# Patient Record
Sex: FEMALE | Race: WHITE
[De-identification: names, ages, dates, MRNs, and addresses within clinical notes are randomized per-mention and may not be internally consistent; named-entity substitution may affect disease eponyms.]

---

## 2017-04-10 ENCOUNTER — HOSPITAL ENCOUNTER (OUTPATIENT)
Dept: HOSPITAL 79 - KOH-I | Age: 67
End: 2017-04-10
Attending: FAMILY MEDICINE
Payer: COMMERCIAL

## 2017-04-10 DIAGNOSIS — R94.4: Primary | ICD-10-CM

## 2017-04-10 DIAGNOSIS — K76.0: ICD-10-CM

## 2017-04-17 ENCOUNTER — HOSPITAL ENCOUNTER (EMERGENCY)
Dept: HOSPITAL 79 - ER1 | Age: 67
Discharge: LEFT BEFORE DISCHARGE | End: 2017-04-17
Payer: COMMERCIAL

## 2017-04-17 DIAGNOSIS — Z53.21: Primary | ICD-10-CM

## 2017-06-08 ENCOUNTER — TELEPHONE (OUTPATIENT)
Dept: ONCOLOGY | Facility: CLINIC | Age: 67
End: 2017-06-08

## 2017-06-08 NOTE — TELEPHONE ENCOUNTER
----- Message from Xuan Jacob sent at 6/8/2017  1:48 PM EDT -----  Regarding: [facility]-LAB ORDER  Contact: [phone]  Patient wants to get some labs drawn she didn't come to December appointment she is tired more than usual? Please call

## 2017-06-08 NOTE — TELEPHONE ENCOUNTER
Spoke with Bailey. She was last seen in September 2016. She had an appt in December 2016 and she cancelled it. Told her that she needs to see Dr. Correia and get scheduled for lab. Told her that she could also see her PCP if she cannot get an appt soon. Transferred to [facility] for appt.

## 2017-06-20 ENCOUNTER — OFFICE VISIT (OUTPATIENT)
Dept: ONCOLOGY | Facility: CLINIC | Age: 67
End: 2017-06-20

## 2017-06-20 ENCOUNTER — APPOINTMENT (OUTPATIENT)
Dept: LAB | Facility: HOSPITAL | Age: 67
End: 2017-06-20

## 2017-06-20 VITALS
HEART RATE: 84 BPM | TEMPERATURE: 97.6 F | DIASTOLIC BLOOD PRESSURE: 85 MMHG | BODY MASS INDEX: 35.33 KG/M2 | SYSTOLIC BLOOD PRESSURE: 200 MMHG | HEIGHT: 62 IN | WEIGHT: 192 LBS | RESPIRATION RATE: 19 BRPM

## 2017-06-20 DIAGNOSIS — D50.8 IRON DEFICIENCY ANEMIA SECONDARY TO INADEQUATE DIETARY IRON INTAKE: Primary | ICD-10-CM

## 2017-06-20 LAB
ALBUMIN SERPL-MCNC: 4.2 G/DL (ref 3.5–5)
ALBUMIN/GLOB SERPL: 1.4 G/DL (ref 1–2)
ALP SERPL-CCNC: 90 U/L (ref 38–126)
ALT SERPL W P-5'-P-CCNC: 63 U/L (ref 13–69)
ANION GAP SERPL CALCULATED.3IONS-SCNC: 17.9 MMOL/L
ANISOCYTOSIS BLD QL: NORMAL
AST SERPL-CCNC: 66 U/L (ref 15–46)
BASOPHILS # BLD AUTO: 0.05 10*3/MM3 (ref 0–0.2)
BASOPHILS NFR BLD AUTO: 0.9 % (ref 0–2.5)
BILIRUB SERPL-MCNC: 0.5 MG/DL (ref 0.2–1.3)
BUN BLD-MCNC: 10 MG/DL (ref 7–20)
BUN/CREAT SERPL: 12.5 (ref 7.1–23.5)
CALCIUM SPEC-SCNC: 9.8 MG/DL (ref 8.4–10.2)
CHLORIDE SERPL-SCNC: 102 MMOL/L (ref 98–107)
CO2 SERPL-SCNC: 25 MMOL/L (ref 26–30)
CREAT BLD-MCNC: 0.8 MG/DL (ref 0.6–1.3)
DEPRECATED RDW RBC AUTO: 44.4 FL (ref 37–54)
ELLIPTOCYTES BLD QL SMEAR: NORMAL
EOSINOPHIL # BLD AUTO: 0.33 10*3/MM3 (ref 0–0.7)
EOSINOPHIL NFR BLD AUTO: 5.9 % (ref 0–7)
ERYTHROCYTE [DISTWIDTH] IN BLOOD BY AUTOMATED COUNT: 17.3 % (ref 11.5–14.5)
FERRITIN SERPL-MCNC: 7.19 NG/ML (ref 11.1–264)
GFR SERPL CREATININE-BSD FRML MDRD: 72 ML/MIN/1.73
GLOBULIN UR ELPH-MCNC: 2.9 GM/DL
GLUCOSE BLD-MCNC: 119 MG/DL (ref 74–98)
HCT VFR BLD AUTO: 28.8 % (ref 37–47)
HGB BLD-MCNC: 8.3 G/DL (ref 12–16)
HYPOCHROMIA BLD QL: NORMAL
IMM GRANULOCYTES # BLD: 0.08 10*3/MM3 (ref 0–0.06)
IMM GRANULOCYTES NFR BLD: 1.4 % (ref 0–0.6)
IRON 24H UR-MRATE: <10 MCG/DL (ref 37–181)
IRON SATN MFR SERPL: ABNORMAL % (ref 11–46)
LYMPHOCYTES # BLD AUTO: 1.6 10*3/MM3 (ref 0.6–3.4)
LYMPHOCYTES NFR BLD AUTO: 28.5 % (ref 10–50)
MCH RBC QN AUTO: 20.5 PG (ref 27–31)
MCHC RBC AUTO-ENTMCNC: 28.8 G/DL (ref 30–37)
MCV RBC AUTO: 71.1 FL (ref 81–99)
MICROCYTES BLD QL: NORMAL
MONOCYTES # BLD AUTO: 0.47 10*3/MM3 (ref 0–0.9)
MONOCYTES NFR BLD AUTO: 8.4 % (ref 0–12)
NEUTROPHILS # BLD AUTO: 3.09 10*3/MM3 (ref 2–6.9)
NEUTROPHILS NFR BLD AUTO: 54.9 % (ref 37–80)
NRBC BLD MANUAL-RTO: 0 /100 WBC (ref 0–0)
PLATELET # BLD AUTO: 253 10*3/MM3 (ref 130–400)
PMV BLD AUTO: 9.9 FL (ref 6–12)
POIKILOCYTOSIS BLD QL SMEAR: NORMAL
POLYCHROMASIA BLD QL SMEAR: NORMAL
POTASSIUM BLD-SCNC: 3.9 MMOL/L (ref 3.5–5.1)
PROT SERPL-MCNC: 7.1 G/DL (ref 6.3–8.2)
RBC # BLD AUTO: 4.05 10*6/MM3 (ref 4.2–5.4)
SMALL PLATELETS BLD QL SMEAR: ADEQUATE
SODIUM BLD-SCNC: 141 MMOL/L (ref 137–145)
TIBC SERPL-MCNC: 442 MCG/DL (ref 261–497)
VIT B12 BLD-MCNC: 386 PG/ML (ref 239–931)
WBC MORPH BLD: NORMAL
WBC NRBC COR # BLD: 5.62 10*3/MM3 (ref 4.8–10.8)

## 2017-06-20 PROCEDURE — 83550 IRON BINDING TEST: CPT | Performed by: NURSE PRACTITIONER

## 2017-06-20 PROCEDURE — 85025 COMPLETE CBC W/AUTO DIFF WBC: CPT | Performed by: NURSE PRACTITIONER

## 2017-06-20 PROCEDURE — 85007 BL SMEAR W/DIFF WBC COUNT: CPT | Performed by: NURSE PRACTITIONER

## 2017-06-20 PROCEDURE — 99213 OFFICE O/P EST LOW 20 MIN: CPT | Performed by: NURSE PRACTITIONER

## 2017-06-20 PROCEDURE — 36415 COLL VENOUS BLD VENIPUNCTURE: CPT | Performed by: NURSE PRACTITIONER

## 2017-06-20 PROCEDURE — 82607 VITAMIN B-12: CPT | Performed by: NURSE PRACTITIONER

## 2017-06-20 PROCEDURE — 83540 ASSAY OF IRON: CPT | Performed by: NURSE PRACTITIONER

## 2017-06-20 PROCEDURE — 80053 COMPREHEN METABOLIC PANEL: CPT | Performed by: NURSE PRACTITIONER

## 2017-06-20 PROCEDURE — 82728 ASSAY OF FERRITIN: CPT | Performed by: NURSE PRACTITIONER

## 2017-06-20 RX ORDER — SODIUM CHLORIDE 9 MG/ML
250 INJECTION, SOLUTION INTRAVENOUS ONCE
Status: CANCELLED | OUTPATIENT
Start: 2017-07-05

## 2017-06-20 RX ORDER — SODIUM CHLORIDE 9 MG/ML
250 INJECTION, SOLUTION INTRAVENOUS ONCE
Status: CANCELLED | OUTPATIENT
Start: 2017-06-26

## 2017-06-20 NOTE — PROGRESS NOTES
PROBLEM LIST:  1. Recurring iron deficiency anemia with intolerance of oral iron.   2. History of breast cancer.   3. History of melanoma.   4. Coronary artery disease with prior stents.   5. History of pulmonary embolus.   6. Hypertension.       CHIEF COMPLAINT: Follow-up about iron deficiency anemia.     Subjective     HISTORY OF PRESENT ILLNESS:   Ms. Howe is here for follow-up evaluation for recurring iron deficiency anemia. She does have an intolerance to oral iron. She is experiencing symptoms that she thinks is related to iron deficiency. She has noticed increased weakness and fatigue along with occasional dizziness and dyspnea with exertion.  She denies any chest pain, palpitations, nose or gum bleeding. No melena, hematochezia, or hematuria.      Past Medical History, Past Surgical History, Social History, Family History have been reviewed and are without significant changes except as mentioned.    Review of Systems   A comprehensive 14 point review of systems was performed and was negative except as mentioned.    Medications:  The current medication list was reviewed in the EMR    ALLERGIES:  No Known Allergies    Objective      BP (!) 200/85  Pulse 84  Temp 97.6 °F (36.4 °C) (Temporal Artery )   Resp 19  Wt 192 lb (87.1 kg)  BMI 35.12 kg/m2         General: well appearing, in no acute distress   HEENT: sclera anicteric, oropharynx clear  Lymphatics: no cervical, supraclavicular, or axillary adenopathy  Cardiovascular: regular rate and rhythm, no murmurs  Lungs: clear to auscultation bilaterally  Abdomen: soft, nontender, nondistended.  No palpable masses or organomegaly  Extremeties: no lower extremity edema, cords or calf tenderness  Skin: no rashes, lesions, bruising, or petechiae    RECENT LABS:  Hematology WBC   Date Value Ref Range Status   09/20/2016 7.18 3.50 - 10.80 10*3/mm3 Final     Hemoglobin   Date Value Ref Range Status   09/20/2016 14.1 11.5 - 15.5 g/dL Final     Hematocrit   Date  Value Ref Range Status   09/20/2016 43.4 34.5 - 44.0 % Final     MCV   Date Value Ref Range Status   09/20/2016 93.5 80.0 - 99.0 fL Final     RDW   Date Value Ref Range Status   09/20/2016 14.2 11.3 - 14.5 % Final     MPV   Date Value Ref Range Status   09/20/2016 10.4 6.0 - 12.0 fL Final     Platelets   Date Value Ref Range Status   09/20/2016 285 150 - 450 10*3/mm3 Final     Immature Grans %   Date Value Ref Range Status   09/20/2016 0.1 0.0 - 0.6 % Final     Neutrophils, Absolute   Date Value Ref Range Status   09/20/2016 3.79 1.50 - 8.30 10*3/mm3 Final     Lymphocytes, Absolute   Date Value Ref Range Status   09/20/2016 2.14 0.60 - 4.80 10*3/mm3 Final     Monocytes, Absolute   Date Value Ref Range Status   09/20/2016 0.83 0.00 - 1.00 10*3/mm3 Final     Eosinophils, Absolute   Date Value Ref Range Status   09/20/2016 0.35 (H) 0.10 - 0.30 10*3/mm3 Final     Basophils, Absolute   Date Value Ref Range Status   09/20/2016 0.06 0.00 - 0.20 10*3/mm3 Final     Immature Grans, Absolute   Date Value Ref Range Status   09/20/2016 0.01 0.00 - 0.03 10*3/mm3 Final       Glucose   Date Value Ref Range Status   08/11/2014 128 (H) 70 - 100 mg/dL Final     Sodium   Date Value Ref Range Status   08/11/2014 138 135 - 153 mmol/L Final     Potassium   Date Value Ref Range Status   08/11/2014 4.1 3.5 - 5.3 mmol/L Final     CO2   Date Value Ref Range Status   08/11/2014 26.7 24.3 - 31.9 mmol/L Final     Chloride   Date Value Ref Range Status   08/11/2014 105 99 - 112 mmol/L Final     Anion Gap   Date Value Ref Range Status   08/11/2014 6.3 3.6 - 11.2 Final     Creatinine   Date Value Ref Range Status   08/11/2014 0.80 0.43 - 1.29 mg/dL Final     BUN   Date Value Ref Range Status   08/11/2014 15 7 - 21 mg/dL Final     BUN/Creatinine Ratio   Date Value Ref Range Status   08/11/2014 18.8  Final     Calcium   Date Value Ref Range Status   08/11/2014 9.1 7.7 - 10.0 mg/dL Final     Alkaline Phosphatase   Date Value Ref Range Status    08/11/2014 70 35 - 104 U/L Final     Total Protein   Date Value Ref Range Status   08/11/2014 6.6 6.0 - 8.0 g/dL Final     ALT (SGPT)   Date Value Ref Range Status   08/11/2014 30 10 - 36 U/L Final     AST (SGOT)   Date Value Ref Range Status   08/11/2014 32 (H) 10 - 30 U/L Final     Total Bilirubin   Date Value Ref Range Status   08/11/2014 0.4 0.2 - 1.8 mg/dL Final     Albumin   Date Value Ref Range Status   08/11/2014 4.2 3.4 - 4.8 g/dL Final     A/G Ratio   Date Value Ref Range Status   08/11/2014 1.8 1.5 - 2.5 Final       No results found for: LDH, URICACID       Assessment/Plan   IMPRESSION:   1. Recurring iron deficiency anemia. She last required IV Feraheme on 04/21/2016. She is currently experiencing symptoms that are most likely related to iron deficiency. We will check her labs today. If her iron is low we will set her up for Feraheme since she is unable to tolerate po iron.       PLAN:   1. We will obtain a CBC, Vitamin B 12, CMP,  ferritin and iron panel today.   2. We will see her back in 2 months for follow-up evaluation. She has been instructed to contact us in the interim if any new symptoms arise.                 Vera Hubbard, [facility] Hematology and Oncology    6/20/2017          CC:

## 2017-06-26 ENCOUNTER — HOSPITAL ENCOUNTER (OUTPATIENT)
Dept: INFUSION THERAPY | Facility: HOSPITAL | Age: 67
Setting detail: INFUSION SERIES
Discharge: HOME | End: 2017-06-26

## 2017-06-26 VITALS
HEIGHT: 62 IN | HEART RATE: 86 BPM | SYSTOLIC BLOOD PRESSURE: 156 MMHG | OXYGEN SATURATION: 99 % | RESPIRATION RATE: 18 BRPM | BODY MASS INDEX: 34.96 KG/M2 | TEMPERATURE: 97.8 F | WEIGHT: 190 LBS | DIASTOLIC BLOOD PRESSURE: 99 MMHG

## 2017-06-26 DIAGNOSIS — D50.8 IRON DEFICIENCY ANEMIA SECONDARY TO INADEQUATE DIETARY IRON INTAKE: ICD-10-CM

## 2017-06-26 PROCEDURE — 96374 THER/PROPH/DIAG INJ IV PUSH: CPT

## 2017-06-26 PROCEDURE — 25010000002 FERUMOXYTOL 510 MG/17ML SOLUTION 510 MG VIAL: Performed by: NURSE PRACTITIONER

## 2017-06-26 RX ORDER — SODIUM CHLORIDE 9 MG/ML
250 INJECTION, SOLUTION INTRAVENOUS ONCE
Status: COMPLETED | OUTPATIENT
Start: 2017-06-26 | End: 2017-06-26

## 2017-06-26 RX ADMIN — SODIUM CHLORIDE 250 ML: 9 INJECTION, SOLUTION INTRAVENOUS at 12:24

## 2017-06-26 RX ADMIN — FERUMOXYTOL 510 MG: 510 INJECTION INTRAVENOUS at 12:25

## 2017-06-26 NOTE — PATIENT INSTRUCTIONS
Post IV Iron Infusion    Notify your doctor/practitioner or come to the emergency room if the following occurs:    1.  Rash  2.  Nausea and vomiting  3.  Diarrhea   4.  Blood in your urine  5.  Rapid heartbeat   6.  Chest pain  7.  Breathing difficulty  8.  Chest pain / tightness  9.  Any other unusual symptoms  10.  Decrease of blood pressure (if Ferumoxytol was given)

## 2017-06-27 ENCOUNTER — APPOINTMENT (OUTPATIENT)
Dept: INFUSION THERAPY | Facility: HOSPITAL | Age: 67
End: 2017-06-27

## 2017-07-05 ENCOUNTER — HOSPITAL ENCOUNTER (OUTPATIENT)
Dept: INFUSION THERAPY | Facility: HOSPITAL | Age: 67
Setting detail: INFUSION SERIES
Discharge: HOME | End: 2017-07-05

## 2017-07-05 VITALS
DIASTOLIC BLOOD PRESSURE: 99 MMHG | TEMPERATURE: 97.5 F | RESPIRATION RATE: 18 BRPM | SYSTOLIC BLOOD PRESSURE: 159 MMHG | HEART RATE: 71 BPM | OXYGEN SATURATION: 97 %

## 2017-07-05 DIAGNOSIS — D50.8 IRON DEFICIENCY ANEMIA SECONDARY TO INADEQUATE DIETARY IRON INTAKE: ICD-10-CM

## 2017-07-05 PROCEDURE — 96374 THER/PROPH/DIAG INJ IV PUSH: CPT

## 2017-07-05 PROCEDURE — 25010000002 FERUMOXYTOL 510 MG/17ML SOLUTION 510 MG VIAL: Performed by: NURSE PRACTITIONER

## 2017-07-05 RX ORDER — SODIUM CHLORIDE 9 MG/ML
250 INJECTION, SOLUTION INTRAVENOUS ONCE
Status: COMPLETED | OUTPATIENT
Start: 2017-07-05 | End: 2017-07-05

## 2017-07-05 RX ADMIN — SODIUM CHLORIDE 250 ML: 9 INJECTION, SOLUTION INTRAVENOUS at 15:06

## 2017-07-05 RX ADMIN — FERUMOXYTOL 510 MG: 510 INJECTION INTRAVENOUS at 15:07

## 2017-08-29 ENCOUNTER — APPOINTMENT (OUTPATIENT)
Dept: LAB | Facility: HOSPITAL | Age: 67
End: 2017-08-29

## 2017-08-29 ENCOUNTER — OFFICE VISIT (OUTPATIENT)
Dept: ONCOLOGY | Facility: CLINIC | Age: 67
End: 2017-08-29

## 2017-08-29 VITALS
HEART RATE: 90 BPM | DIASTOLIC BLOOD PRESSURE: 95 MMHG | SYSTOLIC BLOOD PRESSURE: 171 MMHG | WEIGHT: 188 LBS | RESPIRATION RATE: 20 BRPM | TEMPERATURE: 97.6 F | BODY MASS INDEX: 34.39 KG/M2

## 2017-08-29 DIAGNOSIS — D50.8 IRON DEFICIENCY ANEMIA SECONDARY TO INADEQUATE DIETARY IRON INTAKE: Primary | ICD-10-CM

## 2017-08-29 LAB
ALBUMIN SERPL-MCNC: 4.4 G/DL (ref 3.5–5)
ALBUMIN/GLOB SERPL: 1.5 G/DL (ref 1–2)
ALP SERPL-CCNC: 97 U/L (ref 38–126)
ALT SERPL W P-5'-P-CCNC: 73 U/L (ref 13–69)
ANION GAP SERPL CALCULATED.3IONS-SCNC: 14.4 MMOL/L
ANISOCYTOSIS BLD QL: NORMAL
AST SERPL-CCNC: 45 U/L (ref 15–46)
BASOPHILS # BLD AUTO: 0.07 10*3/MM3 (ref 0–0.2)
BASOPHILS NFR BLD AUTO: 1.1 % (ref 0–2.5)
BILIRUB SERPL-MCNC: 0.4 MG/DL (ref 0.2–1.3)
BUN BLD-MCNC: 11 MG/DL (ref 7–20)
BUN/CREAT SERPL: 15.7 (ref 7.1–23.5)
CALCIUM SPEC-SCNC: 9.9 MG/DL (ref 8.4–10.2)
CHLORIDE SERPL-SCNC: 103 MMOL/L (ref 98–107)
CO2 SERPL-SCNC: 29 MMOL/L (ref 26–30)
CREAT BLD-MCNC: 0.7 MG/DL (ref 0.6–1.3)
DEPRECATED RDW RBC AUTO: 72.2 FL (ref 37–54)
ELLIPTOCYTES BLD QL SMEAR: NORMAL
EOSINOPHIL # BLD AUTO: 0.62 10*3/MM3 (ref 0–0.7)
EOSINOPHIL NFR BLD AUTO: 9.9 % (ref 0–7)
ERYTHROCYTE [DISTWIDTH] IN BLOOD BY AUTOMATED COUNT: 23.4 % (ref 11.5–14.5)
FERRITIN SERPL-MCNC: 21.8 NG/ML (ref 11.1–264)
GFR SERPL CREATININE-BSD FRML MDRD: 83 ML/MIN/1.73
GLOBULIN UR ELPH-MCNC: 3 GM/DL
GLUCOSE BLD-MCNC: 124 MG/DL (ref 74–98)
HCT VFR BLD AUTO: 45.5 % (ref 37–47)
HGB BLD-MCNC: 14.8 G/DL (ref 12–16)
IMM GRANULOCYTES # BLD: 0.02 10*3/MM3 (ref 0–0.06)
IMM GRANULOCYTES NFR BLD: 0.3 % (ref 0–0.6)
IRON 24H UR-MRATE: 53 MCG/DL (ref 37–181)
IRON SATN MFR SERPL: 16 % (ref 11–46)
LYMPHOCYTES # BLD AUTO: 1.93 10*3/MM3 (ref 0.6–3.4)
LYMPHOCYTES NFR BLD AUTO: 30.8 % (ref 10–50)
MCH RBC QN AUTO: 28.1 PG (ref 27–31)
MCHC RBC AUTO-ENTMCNC: 32.5 G/DL (ref 30–37)
MCV RBC AUTO: 86.3 FL (ref 81–99)
MONOCYTES # BLD AUTO: 0.58 10*3/MM3 (ref 0–0.9)
MONOCYTES NFR BLD AUTO: 9.3 % (ref 0–12)
NEUTROPHILS # BLD AUTO: 3.05 10*3/MM3 (ref 2–6.9)
NEUTROPHILS NFR BLD AUTO: 48.6 % (ref 37–80)
NRBC BLD MANUAL-RTO: 0 /100 WBC (ref 0–0)
PLATELET # BLD AUTO: 216 10*3/MM3 (ref 130–400)
PMV BLD AUTO: 10.2 FL (ref 6–12)
POIKILOCYTOSIS BLD QL SMEAR: NORMAL
POTASSIUM BLD-SCNC: 4.4 MMOL/L (ref 3.5–5.1)
PROT SERPL-MCNC: 7.4 G/DL (ref 6.3–8.2)
RBC # BLD AUTO: 5.27 10*6/MM3 (ref 4.2–5.4)
SMALL PLATELETS BLD QL SMEAR: ADEQUATE
SODIUM BLD-SCNC: 142 MMOL/L (ref 137–145)
TIBC SERPL-MCNC: 336 MCG/DL (ref 261–497)
WBC MORPH BLD: NORMAL
WBC NRBC COR # BLD: 6.27 10*3/MM3 (ref 4.8–10.8)

## 2017-08-29 PROCEDURE — 83540 ASSAY OF IRON: CPT | Performed by: NURSE PRACTITIONER

## 2017-08-29 PROCEDURE — 85007 BL SMEAR W/DIFF WBC COUNT: CPT | Performed by: NURSE PRACTITIONER

## 2017-08-29 PROCEDURE — 80053 COMPREHEN METABOLIC PANEL: CPT | Performed by: NURSE PRACTITIONER

## 2017-08-29 PROCEDURE — 83550 IRON BINDING TEST: CPT | Performed by: NURSE PRACTITIONER

## 2017-08-29 PROCEDURE — 99213 OFFICE O/P EST LOW 20 MIN: CPT | Performed by: NURSE PRACTITIONER

## 2017-08-29 PROCEDURE — 36415 COLL VENOUS BLD VENIPUNCTURE: CPT | Performed by: NURSE PRACTITIONER

## 2017-08-29 PROCEDURE — 82728 ASSAY OF FERRITIN: CPT | Performed by: NURSE PRACTITIONER

## 2017-08-29 PROCEDURE — 85025 COMPLETE CBC W/AUTO DIFF WBC: CPT | Performed by: NURSE PRACTITIONER

## 2017-08-29 RX ORDER — AMLODIPINE BESYLATE 10 MG/1
TABLET ORAL
Refills: 2 | COMMUNITY
Start: 2017-08-05 | End: 2019-03-19 | Stop reason: HOSPADM

## 2017-08-29 NOTE — PROGRESS NOTES
PROBLEM LIST:  1.  Recurring iron deficiency anemia with intolerance of oral iron.   2.  History of breast cancer.   3.  History of melanoma.   4.  Coronary artery disease with prior stents.   5.  History of pulmonary embolus.   6.  Hypertension.      CHIEF COMPLAINT: Follow-up about iron deficiency anemia.    Subjective     HISTORY OF PRESENT ILLNESS:   Mrs. Howe is here for follow up evaluation of anemia. She required IV Feraheme on 6/26/2017 and 07/05/2017 for iron deficiency anemia. She tolerated her infusions well. Her fatigue has resolved. She denies any chest pain, palpitations, or shortness of air.     Past Medical History, Past Surgical History, Social History, Family History have been reviewed and are without significant changes except as mentioned.    Review of Systems   A comprehensive 14 point review of systems was performed and was negative except as mentioned.    Medications:  The current medication list was reviewed in the EMR    ALLERGIES:  No Known Allergies    Objective      /95  Pulse 90  Temp 97.6 °F (36.4 °C) (Temporal Artery )   Resp 20  Wt 188 lb (85.3 kg)  BMI 34.39 kg/m2         General: well appearing, in no acute distress   HEENT: sclera anicteric, oropharynx clear  Lymphatics: no cervical, supraclavicular, or axillary adenopathy  Cardiovascular: regular rate and rhythm, no murmurs  Lungs: clear to auscultation bilaterally  Abdomen: soft, nontender, nondistended.  No palpable masses or organomegaly  Extremeties: no lower extremity edema, cords or calf tenderness  Skin: no rashes, lesions, bruising, or petechiae    RECENT LABS:  Hematology WBC   Date Value Ref Range Status   06/20/2017 5.62 4.80 - 10.80 10*3/mm3 Final     Hemoglobin   Date Value Ref Range Status   06/20/2017 8.3 (L) 12.0 - 16.0 g/dL Final     Hematocrit   Date Value Ref Range Status   06/20/2017 28.8 (L) 37.0 - 47.0 % Final     MCV   Date Value Ref Range Status   06/20/2017 71.1 (L) 81.0 - 99.0 fL Final      RDW   Date Value Ref Range Status   06/20/2017 17.3 (H) 11.5 - 14.5 % Final     MPV   Date Value Ref Range Status   06/20/2017 9.9 6.0 - 12.0 fL Final     Platelets   Date Value Ref Range Status   06/20/2017 253 130 - 400 10*3/mm3 Final     Immature Grans %   Date Value Ref Range Status   06/20/2017 1.4 (H) 0.0 - 0.6 % Final     Neutrophils, Absolute   Date Value Ref Range Status   06/20/2017 3.09 2.00 - 6.90 10*3/mm3 Final     Lymphocytes, Absolute   Date Value Ref Range Status   06/20/2017 1.60 0.60 - 3.40 10*3/mm3 Final     Monocytes, Absolute   Date Value Ref Range Status   06/20/2017 0.47 0.00 - 0.90 10*3/mm3 Final     Eosinophils, Absolute   Date Value Ref Range Status   06/20/2017 0.33 0.00 - 0.70 10*3/mm3 Final     Basophils, Absolute   Date Value Ref Range Status   06/20/2017 0.05 0.00 - 0.20 10*3/mm3 Final     Immature Grans, Absolute   Date Value Ref Range Status   06/20/2017 0.08 (H) 0.00 - 0.06 10*3/mm3 Final     nRBC   Date Value Ref Range Status   06/20/2017 0.0 0.0 - 0.0 /100 WBC Final       Glucose   Date Value Ref Range Status   06/20/2017 119 (H) 74 - 98 mg/dL Final     Sodium   Date Value Ref Range Status   06/20/2017 141 137 - 145 mmol/L Final     Potassium   Date Value Ref Range Status   06/20/2017 3.9 3.5 - 5.1 mmol/L Final     CO2   Date Value Ref Range Status   06/20/2017 25.0 (L) 26.0 - 30.0 mmol/L Final     Chloride   Date Value Ref Range Status   06/20/2017 102 98 - 107 mmol/L Final     Anion Gap   Date Value Ref Range Status   06/20/2017 17.9 mmol/L Final     Creatinine   Date Value Ref Range Status   06/20/2017 0.80 0.60 - 1.30 mg/dL Final     BUN   Date Value Ref Range Status   06/20/2017 10 7 - 20 mg/dL Final     BUN/Creatinine Ratio   Date Value Ref Range Status   06/20/2017 12.5 7.1 - 23.5 Final     Calcium   Date Value Ref Range Status   06/20/2017 9.8 8.4 - 10.2 mg/dL Final     eGFR Non  Amer   Date Value Ref Range Status   06/20/2017 72 >60 mL/min/1.73 Final      Alkaline Phosphatase   Date Value Ref Range Status   06/20/2017 90 38 - 126 U/L Final     Total Protein   Date Value Ref Range Status   06/20/2017 7.1 6.3 - 8.2 g/dL Final     ALT (SGPT)   Date Value Ref Range Status   06/20/2017 63 13 - 69 U/L Final     AST (SGOT)   Date Value Ref Range Status   06/20/2017 66 (H) 15 - 46 U/L Final     Total Bilirubin   Date Value Ref Range Status   06/20/2017 0.5 0.2 - 1.3 mg/dL Final     Albumin   Date Value Ref Range Status   06/20/2017 4.20 3.50 - 5.00 g/dL Final     Globulin   Date Value Ref Range Status   06/20/2017 2.9 gm/dL Final     A/G Ratio   Date Value Ref Range Status   06/20/2017 1.4 1.0 - 2.0 g/dL Final       No results found for: LDH, URICACID       Assessment/Plan   IMPRESSION:   1. Recurring iron deficiency anemia. She had symptomatic anemia and required an IV Feraheme on 06/26 and 07/5/2017.  She is feeling better at this time.      PLAN:   1.  We will obtain a CBC, CMP, ferritin and iron panel today  2.  If these are improving, we will obtain a CBC in 2 months   3. We will see her back in 4 months for follow-up evaluation. She has been instructed to contact us in the interim if any new symptoms arise.               Vera Hubbard APRMATT  [facility] Hematology and Oncology    8/29/2017          CC:

## 2018-07-03 VITALS — HEIGHT: 66 IN | BODY MASS INDEX: 30.22 KG/M2 | WEIGHT: 188 LBS

## 2018-07-03 DIAGNOSIS — IMO0001 IRON AND ITS COMPOUNDS CAUSING ADVERSE EFFECT IN THERAPEUTIC USE, SUBSEQUENT ENCOUNTER: ICD-10-CM

## 2018-07-03 DIAGNOSIS — D50.8 IRON DEFICIENCY ANEMIA SECONDARY TO INADEQUATE DIETARY IRON INTAKE: ICD-10-CM

## 2018-07-03 PROBLEM — T45.4X5A ADVERSE EFFECT OF IRON OR ITS COMPOUND: Status: ACTIVE | Noted: 2018-07-03

## 2018-07-03 RX ORDER — SODIUM CHLORIDE 9 MG/ML
250 INJECTION, SOLUTION INTRAVENOUS ONCE
Status: CANCELLED | OUTPATIENT
Start: 2018-07-24

## 2018-07-03 RX ORDER — SODIUM CHLORIDE 9 MG/ML
250 INJECTION, SOLUTION INTRAVENOUS ONCE
Status: CANCELLED | OUTPATIENT
Start: 2018-07-17

## 2018-07-17 ENCOUNTER — OFFICE VISIT (OUTPATIENT)
Dept: ONCOLOGY | Facility: CLINIC | Age: 68
End: 2018-07-17

## 2018-07-17 ENCOUNTER — INFUSION (OUTPATIENT)
Dept: ONCOLOGY | Facility: HOSPITAL | Age: 68
End: 2018-07-17

## 2018-07-17 VITALS
SYSTOLIC BLOOD PRESSURE: 135 MMHG | OXYGEN SATURATION: 100 % | DIASTOLIC BLOOD PRESSURE: 83 MMHG | BODY MASS INDEX: 35.19 KG/M2 | WEIGHT: 191.2 LBS | TEMPERATURE: 97.3 F | HEART RATE: 93 BPM | RESPIRATION RATE: 20 BRPM | HEIGHT: 62 IN

## 2018-07-17 VITALS — DIASTOLIC BLOOD PRESSURE: 77 MMHG | SYSTOLIC BLOOD PRESSURE: 153 MMHG | HEART RATE: 72 BPM

## 2018-07-17 DIAGNOSIS — IMO0001 IRON AND ITS COMPOUNDS CAUSING ADVERSE EFFECT IN THERAPEUTIC USE, SUBSEQUENT ENCOUNTER: Primary | ICD-10-CM

## 2018-07-17 DIAGNOSIS — D50.8 IRON DEFICIENCY ANEMIA SECONDARY TO INADEQUATE DIETARY IRON INTAKE: Primary | ICD-10-CM

## 2018-07-17 DIAGNOSIS — D50.8 IRON DEFICIENCY ANEMIA SECONDARY TO INADEQUATE DIETARY IRON INTAKE: ICD-10-CM

## 2018-07-17 PROCEDURE — 96374 THER/PROPH/DIAG INJ IV PUSH: CPT

## 2018-07-17 PROCEDURE — 99213 OFFICE O/P EST LOW 20 MIN: CPT | Performed by: NURSE PRACTITIONER

## 2018-07-17 PROCEDURE — 25010000002 FERUMOXYTOL 510 MG/17ML SOLUTION 510 MG VIAL: Performed by: NURSE PRACTITIONER

## 2018-07-17 RX ORDER — SODIUM CHLORIDE 9 MG/ML
250 INJECTION, SOLUTION INTRAVENOUS ONCE
Status: DISCONTINUED | OUTPATIENT
Start: 2018-07-17 | End: 2018-07-17 | Stop reason: HOSPADM

## 2018-07-17 RX ADMIN — FERUMOXYTOL 510 MG: 510 INJECTION INTRAVENOUS at 14:07

## 2018-07-17 NOTE — PROGRESS NOTES
"      PROBLEM LIST:  1.  Recurring iron deficiency anemia with intolerance of oral iron.   2.  History of breast cancer.   3.  History of melanoma.   4.  Coronary artery disease with prior stents.   5.  History of pulmonary embolus.   6.  Hypertension.      CHIEF COMPLAINT: Follow-up about iron deficiency anemia.    Subjective     HISTORY OF PRESENT ILLNESS:   Mrs. Howe is here for follow up evaluation of anemia.  She reports approximately 1 month of fatigue and dyspnea.  She is craving ice and cheese.  She denies any chest pain or palpitations.  No nose or gum bleeding.  No melena hematochezia or hematuria.  Her last IV Feraheme was one year ago.    Past Medical History, Past Surgical History, Social History, Family History have been reviewed and are without significant changes except as mentioned.    Review of Systems   A comprehensive 14 point review of systems was performed and was negative except as mentioned.    Medications:  The current medication list was reviewed in the EMR    ALLERGIES:  No Known Allergies    Objective      /83   Pulse 93   Temp 97.3 °F (36.3 °C) (Temporal Artery )   Resp 20   Ht 157.5 cm (62.01\")   Wt 86.7 kg (191 lb 3.2 oz)   SpO2 100%   BMI 34.96 kg/m²          General: well appearing, in no acute distress   HEENT: sclera anicteric, oropharynx clear  Lymphatics: no cervical, supraclavicular, or axillary adenopathy  Cardiovascular: regular rate and rhythm, no murmurs  Lungs: clear to auscultation bilaterally  Abdomen: soft, nontender, nondistended.  No palpable masses or organomegaly  Extremeties: no lower extremity edema, cords or calf tenderness  Skin: no rashes, lesions, bruising, or petechiae    RECENT LABS:  Hematology     7/2/2018:              Iron 16   Iron saturation 4   Ferritin 10   WBCs 6.0  RBC 4.20  Hemoglobin 8.9  Hematocrit 30.8  MCV 73  MCH 21.2  RDW 16.5  Platelets 373                Assessment/Plan   IMPRESSION:   1. Recurring iron deficiency anemia. She " had symptomatic anemia and required IV Feraheme last 07/5/2017.  She has recurring symptomatic iron deficiency again at this time and we have her set up for IV Feraheme today and next week.     PLAN:   1. IV Feraheme is set up for today and next week.  2. CBC, ferritin, and iron profile in 6 weeks.  3. We will see her back in 4 months for follow-up evaluation. She has been instructed to contact us in the interim if any new symptoms arise.               SUZETTE Medrano  [facility] Hematology and Oncology    7/17/2018          CC:

## 2018-07-24 ENCOUNTER — INFUSION (OUTPATIENT)
Dept: ONCOLOGY | Facility: HOSPITAL | Age: 68
End: 2018-07-24

## 2018-07-24 DIAGNOSIS — D50.8 IRON DEFICIENCY ANEMIA SECONDARY TO INADEQUATE DIETARY IRON INTAKE: ICD-10-CM

## 2018-07-24 DIAGNOSIS — IMO0001 IRON AND ITS COMPOUNDS CAUSING ADVERSE EFFECT IN THERAPEUTIC USE, SUBSEQUENT ENCOUNTER: Primary | ICD-10-CM

## 2018-07-24 PROCEDURE — 96374 THER/PROPH/DIAG INJ IV PUSH: CPT

## 2018-07-24 PROCEDURE — 25010000002 FERUMOXYTOL 510 MG/17ML SOLUTION 510 MG VIAL: Performed by: NURSE PRACTITIONER

## 2018-07-24 RX ORDER — SODIUM CHLORIDE 9 MG/ML
250 INJECTION, SOLUTION INTRAVENOUS ONCE
Status: DISCONTINUED | OUTPATIENT
Start: 2018-07-24 | End: 2018-07-24 | Stop reason: HOSPADM

## 2018-07-24 RX ADMIN — FERUMOXYTOL 510 MG: 510 INJECTION INTRAVENOUS at 13:55

## 2018-11-13 ENCOUNTER — OFFICE VISIT (OUTPATIENT)
Dept: ONCOLOGY | Facility: CLINIC | Age: 68
End: 2018-11-13

## 2018-11-13 ENCOUNTER — LAB (OUTPATIENT)
Dept: LAB | Facility: HOSPITAL | Age: 68
End: 2018-11-13

## 2018-11-13 VITALS
RESPIRATION RATE: 12 BRPM | TEMPERATURE: 97.1 F | DIASTOLIC BLOOD PRESSURE: 91 MMHG | HEIGHT: 62 IN | BODY MASS INDEX: 34.04 KG/M2 | WEIGHT: 185 LBS | HEART RATE: 87 BPM | SYSTOLIC BLOOD PRESSURE: 164 MMHG

## 2018-11-13 DIAGNOSIS — D50.8 IRON DEFICIENCY ANEMIA SECONDARY TO INADEQUATE DIETARY IRON INTAKE: Primary | ICD-10-CM

## 2018-11-13 DIAGNOSIS — D50.8 IRON DEFICIENCY ANEMIA SECONDARY TO INADEQUATE DIETARY IRON INTAKE: ICD-10-CM

## 2018-11-13 LAB
BASOPHILS # BLD AUTO: 0.09 10*3/MM3 (ref 0–0.2)
BASOPHILS NFR BLD AUTO: 1.3 % (ref 0–2.5)
DEPRECATED RDW RBC AUTO: 47.1 FL (ref 37–54)
EOSINOPHIL # BLD AUTO: 0.45 10*3/MM3 (ref 0–0.7)
EOSINOPHIL NFR BLD AUTO: 6.5 % (ref 0–7)
ERYTHROCYTE [DISTWIDTH] IN BLOOD BY AUTOMATED COUNT: 14.9 % (ref 11.5–14.5)
HCT VFR BLD AUTO: 41.4 % (ref 37–47)
HGB BLD-MCNC: 13 G/DL (ref 12–16)
IMM GRANULOCYTES # BLD: 0.02 10*3/MM3 (ref 0–0.06)
IMM GRANULOCYTES NFR BLD: 0.3 % (ref 0–0.6)
IRON 24H UR-MRATE: 39 MCG/DL (ref 37–181)
IRON SATN MFR SERPL: 9 % (ref 11–46)
LYMPHOCYTES # BLD AUTO: 1.46 10*3/MM3 (ref 0.6–3.4)
LYMPHOCYTES NFR BLD AUTO: 20.9 % (ref 10–50)
MCH RBC QN AUTO: 27.4 PG (ref 27–31)
MCHC RBC AUTO-ENTMCNC: 31.4 G/DL (ref 30–37)
MCV RBC AUTO: 87.3 FL (ref 81–99)
MONOCYTES # BLD AUTO: 0.56 10*3/MM3 (ref 0–0.9)
MONOCYTES NFR BLD AUTO: 8 % (ref 0–12)
NEUTROPHILS # BLD AUTO: 4.39 10*3/MM3 (ref 2–6.9)
NEUTROPHILS NFR BLD AUTO: 63 % (ref 37–80)
NRBC BLD MANUAL-RTO: 0 /100 WBC (ref 0–0)
PLATELET # BLD AUTO: 269 10*3/MM3 (ref 130–400)
PMV BLD AUTO: 10.5 FL (ref 6–12)
RBC # BLD AUTO: 4.74 10*6/MM3 (ref 4.2–5.4)
TIBC SERPL-MCNC: 429 MCG/DL (ref 261–497)
WBC NRBC COR # BLD: 6.97 10*3/MM3 (ref 4.8–10.8)

## 2018-11-13 PROCEDURE — 36415 COLL VENOUS BLD VENIPUNCTURE: CPT | Performed by: NURSE PRACTITIONER

## 2018-11-13 PROCEDURE — 82728 ASSAY OF FERRITIN: CPT | Performed by: NURSE PRACTITIONER

## 2018-11-13 PROCEDURE — 99213 OFFICE O/P EST LOW 20 MIN: CPT | Performed by: NURSE PRACTITIONER

## 2018-11-13 PROCEDURE — 83540 ASSAY OF IRON: CPT | Performed by: NURSE PRACTITIONER

## 2018-11-13 PROCEDURE — 85025 COMPLETE CBC W/AUTO DIFF WBC: CPT | Performed by: NURSE PRACTITIONER

## 2018-11-13 PROCEDURE — 83550 IRON BINDING TEST: CPT | Performed by: NURSE PRACTITIONER

## 2018-11-13 RX ORDER — POTASSIUM CHLORIDE 1500 MG/1
20 TABLET, FILM COATED, EXTENDED RELEASE ORAL AS NEEDED
COMMUNITY

## 2018-11-13 RX ORDER — CHLORTHALIDONE 25 MG/1
25 TABLET ORAL DAILY
COMMUNITY

## 2018-11-13 NOTE — PROGRESS NOTES
"      PROBLEM LIST:  1.  Recurring iron deficiency anemia with intolerance of oral iron.   2.  History of breast cancer.   3.  History of melanoma.   4.  Coronary artery disease with prior stents.   5.  History of pulmonary embolus.   6.  Hypertension.      CHIEF COMPLAINT: Follow-up about iron deficiency anemia.    Subjective     HISTORY OF PRESENT ILLNESS:   Mrs. Howe is here for follow up evaluation of anemia.  She's been feeling better since her last IV iron infusion 7/24/2018.  Her energy is much better.  She has no cravings recently.  She denies any chest pain or palpitations.  No nose or gum bleeding.  No melena or, hematochezia or hematuria.      Past Medical History, Past Surgical History, Social History, Family History have been reviewed and are without significant changes except as mentioned.    Review of Systems   A comprehensive 14 point review of systems was performed and was negative except as mentioned.    Medications:  The current medication list was reviewed in the EMR    ALLERGIES:  No Known Allergies    Objective      /91   Pulse 87   Temp 97.1 °F (36.2 °C) (Temporal)   Resp 12   Ht 157.5 cm (62.01\")   Wt 83.9 kg (185 lb)   BMI 33.83 kg/m²          General: well appearing, in no acute distress   HEENT: sclera anicteric, oropharynx clear  Lymphatics: no cervical, supraclavicular, or axillary adenopathy  Cardiovascular: regular rate and rhythm, no murmurs  Lungs: clear to auscultation bilaterally  Abdomen: soft, nontender, nondistended.  No palpable masses or organomegaly  Extremeties: no lower extremity edema, cords or calf tenderness  Skin: no rashes, lesions, bruising, or petechiae    RECENT LABS:  Hematology     7/2/2018:              Iron 16   Iron saturation 4   Ferritin 10   WBCs 6.0  RBC 4.20  Hemoglobin 8.9  Hematocrit 30.8  MCV 73  MCH 21.2  RDW 16.5  Platelets 373                Assessment/Plan   IMPRESSION:   1. Recurring iron deficiency anemia. She received IV Feraheme " 7/24/2018 and is feeling much better. We will repeat labs today to make sure her iron deficiency has been corrected.      PLAN:   1. CBC, ferritin, and iron profile today and repeat cbc in 8 weeks.  2. We will see her back in 4 months for follow-up evaluation. She has been instructed to contact us in the interim if any new symptoms arise.               Vera Hubbard, [facility] Hematology and Oncology    11/13/2018          CC:

## 2018-11-14 ENCOUNTER — TELEPHONE (OUTPATIENT)
Dept: ONCOLOGY | Facility: CLINIC | Age: 68
End: 2018-11-14

## 2018-11-14 LAB — FERRITIN SERPL-MCNC: 11.4 NG/ML (ref 11.1–264)

## 2019-02-16 LAB
BASOPHILS # BLD AUTO: 0.1 X10E3/UL (ref 0–0.2)
BASOPHILS NFR BLD AUTO: 1 %
EOSINOPHIL # BLD AUTO: 0.5 X10E3/UL (ref 0–0.4)
EOSINOPHIL NFR BLD AUTO: 8 %
ERYTHROCYTE [DISTWIDTH] IN BLOOD BY AUTOMATED COUNT: 16.3 % (ref 12.3–15.4)
HCT VFR BLD AUTO: 32.6 % (ref 34–46.6)
HGB BLD-MCNC: 9.5 G/DL (ref 11.1–15.9)
IMM GRANULOCYTES # BLD AUTO: 0 X10E3/UL (ref 0–0.1)
IMM GRANULOCYTES NFR BLD AUTO: 0 %
LYMPHOCYTES # BLD AUTO: 2 X10E3/UL (ref 0.7–3.1)
LYMPHOCYTES NFR BLD AUTO: 35 %
MCH RBC QN AUTO: 21.3 PG (ref 26.6–33)
MCHC RBC AUTO-ENTMCNC: 29.1 G/DL (ref 31.5–35.7)
MCV RBC AUTO: 73 FL (ref 79–97)
MONOCYTES # BLD AUTO: 0.6 X10E3/UL (ref 0.1–0.9)
MONOCYTES NFR BLD AUTO: 10 %
NEUTROPHILS # BLD AUTO: 2.7 X10E3/UL (ref 1.4–7)
NEUTROPHILS NFR BLD AUTO: 46 %
PLATELET # BLD AUTO: 360 X10E3/UL (ref 150–379)
RBC # BLD AUTO: 4.46 X10E6/UL (ref 3.77–5.28)
WBC # BLD AUTO: 5.9 X10E3/UL (ref 3.4–10.8)

## 2019-03-18 NOTE — PROGRESS NOTES
"      PROBLEM LIST:  1.  Recurring iron deficiency anemia with intolerance of oral iron.   2.  History of breast cancer.   3.  History of melanoma.   4.  Coronary artery disease with prior stents.   5.  History of pulmonary embolus.   6.  Hypertension.      CHIEF COMPLAINT: Follow-up about iron deficiency anemia.    Subjective     HISTORY OF PRESENT ILLNESS:   Mrs. Howe is here for follow up evaluation of anemia.  She has not been feeling very well lately. She has been weak.  Her last labs were noted to be low. She has not had iron since 07/2018.  She has had cravings.  No melena or, hematochezia or hematuria. Denies any chest pain or palpitations.  No nose or gum bleeding.  She has had some shortness of breath with activity.           Past Medical History, Past Surgical History, Social History, Family History have been reviewed and are without significant changes except as mentioned.    Review of Systems   A comprehensive 14 point review of systems was performed and was negative except as mentioned.    Medications:  The current medication list was reviewed in the EMR    ALLERGIES:  No Known Allergies    Objective      BP (!) 187/90   Pulse 82   Temp 97.5 °F (36.4 °C) (Temporal)   Resp 17   Ht 157.5 cm (62.01\")   Wt 85.3 kg (188 lb)   BMI 34.37 kg/m²          General: well appearing, in no acute distress   HEENT: sclera anicteric, oropharynx clear  Lymphatics: no cervical, supraclavicular, or axillary adenopathy  Cardiovascular: regular rate and rhythm, no murmurs  Lungs: clear to auscultation bilaterally  Abdomen: soft, nontender, nondistended.  No palpable masses or organomegaly  Extremeties: no lower extremity edema, cords or calf tenderness  Skin: no rashes, lesions, bruising, or petechiae    RECENT LABS:  Hematology   02/15/2019    Wbc: 5.9, Hgb: 9.5,   Hct: 32.6,  Plt: 360,                     Assessment/Plan     1. Recurring iron deficiency anemia. She received IV Feraheme 7/24/2018.  Recent labs " indicated that her Hgb is low.  Ferritin was not drawn. We will repeat labs today to ensure a blood transfusion is not needed.  I ordered Feraheme for IV infusion today and in one week since she is unable to tolerate oral iron. We will plan for CBC, ferritin, and iron profile today and repeat cbc in 8 weeks to ensure Hgb is rising.  We will see her back in 4 months for follow-up evaluation with labs including CBC, CMP Ferritin and Iron profile. I provided the patient with both [employer] and [employer] office numbers and to call with any new problems or concerns.             SUZETTE Roach  [facility] Hematology and Oncology    3/19/2019        I spent 25 minutes with the patient. I spent > 50% percent of this time counseling and discussing prognosis, diagnostic testing, evaluation, current status and management.      CC:

## 2019-03-19 ENCOUNTER — OFFICE VISIT (OUTPATIENT)
Dept: ONCOLOGY | Facility: CLINIC | Age: 69
End: 2019-03-19

## 2019-03-19 ENCOUNTER — INFUSION (OUTPATIENT)
Dept: ONCOLOGY | Facility: HOSPITAL | Age: 69
End: 2019-03-19

## 2019-03-19 VITALS
HEIGHT: 62 IN | DIASTOLIC BLOOD PRESSURE: 90 MMHG | WEIGHT: 188 LBS | SYSTOLIC BLOOD PRESSURE: 187 MMHG | HEART RATE: 82 BPM | BODY MASS INDEX: 34.6 KG/M2 | RESPIRATION RATE: 17 BRPM | TEMPERATURE: 97.5 F

## 2019-03-19 DIAGNOSIS — IMO0001 IRON AND ITS COMPOUNDS CAUSING ADVERSE EFFECT IN THERAPEUTIC USE, SUBSEQUENT ENCOUNTER: ICD-10-CM

## 2019-03-19 DIAGNOSIS — D50.8 IRON DEFICIENCY ANEMIA SECONDARY TO INADEQUATE DIETARY IRON INTAKE: ICD-10-CM

## 2019-03-19 DIAGNOSIS — D50.8 IRON DEFICIENCY ANEMIA SECONDARY TO INADEQUATE DIETARY IRON INTAKE: Primary | ICD-10-CM

## 2019-03-19 LAB
ANISOCYTOSIS BLD QL: NORMAL
BASOPHILS # BLD AUTO: 0.08 10*3/MM3 (ref 0–0.2)
BASOPHILS # BLD MANUAL: 0.07 10*3/MM3 (ref 0–0.2)
BASOPHILS NFR BLD AUTO: 1 % (ref 0–2.5)
BASOPHILS NFR BLD AUTO: 1.1 % (ref 0–2.5)
DEPRECATED RDW RBC AUTO: 45.3 FL (ref 37–54)
ELLIPTOCYTES BLD QL SMEAR: NORMAL
EOSINOPHIL # BLD AUTO: 0.5 10*3/MM3 (ref 0–0.7)
EOSINOPHIL # BLD MANUAL: 0.44 10*3/MM3 (ref 0–0.7)
EOSINOPHIL NFR BLD AUTO: 6.9 % (ref 0–7)
EOSINOPHIL NFR BLD MANUAL: 6 % (ref 0–7)
ERYTHROCYTE [DISTWIDTH] IN BLOOD BY AUTOMATED COUNT: 17.3 % (ref 11.5–14.5)
FERRITIN SERPL-MCNC: 6.67 NG/ML (ref 11.1–264)
HCT VFR BLD AUTO: 30.5 % (ref 37–47)
HGB BLD-MCNC: 8.5 G/DL (ref 12–16)
HYPOCHROMIA BLD QL: NORMAL
IMM GRANULOCYTES # BLD AUTO: 0.03 10*3/MM3 (ref 0–0.06)
IMM GRANULOCYTES NFR BLD AUTO: 0.4 % (ref 0–0.6)
IRON 24H UR-MRATE: 37 MCG/DL (ref 37–181)
IRON SATN MFR SERPL: 8 % (ref 11–46)
LARGE PLATELETS: NORMAL
LYMPHOCYTES # BLD AUTO: 2.01 10*3/MM3 (ref 0.6–3.4)
LYMPHOCYTES # BLD MANUAL: 1.67 10*3/MM3 (ref 0.6–3.4)
LYMPHOCYTES NFR BLD AUTO: 27.6 % (ref 10–50)
LYMPHOCYTES NFR BLD MANUAL: 23 % (ref 10–50)
LYMPHOCYTES NFR BLD MANUAL: 9 % (ref 5–12)
MCH RBC QN AUTO: 20.5 PG (ref 27–31)
MCHC RBC AUTO-ENTMCNC: 27.9 G/DL (ref 30–37)
MCV RBC AUTO: 73.5 FL (ref 81–99)
MICROCYTES BLD QL: NORMAL
MONOCYTES # BLD AUTO: 0.6 10*3/MM3 (ref 0–0.9)
MONOCYTES # BLD AUTO: 0.65 10*3/MM3 (ref 0–0.9)
MONOCYTES NFR BLD AUTO: 8.3 % (ref 0–12)
NEUTROPHILS # BLD AUTO: 4.05 10*3/MM3 (ref 2–6.9)
NEUTROPHILS # BLD AUTO: 4.43 10*3/MM3 (ref 2–6.9)
NEUTROPHILS NFR BLD AUTO: 55.7 % (ref 37–80)
NEUTROPHILS NFR BLD MANUAL: 61 % (ref 37–80)
NRBC BLD AUTO-RTO: 0.3 /100 WBC (ref 0–0)
OVALOCYTES BLD QL SMEAR: NORMAL
PLATELET # BLD AUTO: 266 10*3/MM3 (ref 130–400)
PMV BLD AUTO: 9.4 FL (ref 6–12)
POIKILOCYTOSIS BLD QL SMEAR: NORMAL
RBC # BLD AUTO: 4.15 10*6/MM3 (ref 4.2–5.4)
SCAN SLIDE: NORMAL
SMALL PLATELETS BLD QL SMEAR: ADEQUATE
TIBC SERPL-MCNC: 440 MCG/DL (ref 261–497)
WBC MORPH BLD: NORMAL
WBC NRBC COR # BLD: 7.27 10*3/MM3 (ref 4.8–10.8)

## 2019-03-19 PROCEDURE — 83550 IRON BINDING TEST: CPT

## 2019-03-19 PROCEDURE — 96375 TX/PRO/DX INJ NEW DRUG ADDON: CPT

## 2019-03-19 PROCEDURE — 99214 OFFICE O/P EST MOD 30 MIN: CPT | Performed by: NURSE PRACTITIONER

## 2019-03-19 PROCEDURE — 36415 COLL VENOUS BLD VENIPUNCTURE: CPT

## 2019-03-19 PROCEDURE — 85007 BL SMEAR W/DIFF WBC COUNT: CPT

## 2019-03-19 PROCEDURE — 83540 ASSAY OF IRON: CPT

## 2019-03-19 PROCEDURE — 25010000002 FERUMOXYTOL 510 MG/17ML SOLUTION 510 MG VIAL: Performed by: NURSE PRACTITIONER

## 2019-03-19 PROCEDURE — 85025 COMPLETE CBC W/AUTO DIFF WBC: CPT

## 2019-03-19 PROCEDURE — 96374 THER/PROPH/DIAG INJ IV PUSH: CPT

## 2019-03-19 PROCEDURE — 82728 ASSAY OF FERRITIN: CPT

## 2019-03-19 RX ORDER — ASPIRIN 325 MG
325 TABLET ORAL DAILY
COMMUNITY
End: 2020-06-10 | Stop reason: HOSPADM

## 2019-03-19 RX ORDER — SODIUM CHLORIDE 9 MG/ML
250 INJECTION, SOLUTION INTRAVENOUS ONCE
Status: DISCONTINUED | OUTPATIENT
Start: 2019-03-19 | End: 2019-03-19 | Stop reason: HOSPADM

## 2019-03-19 RX ORDER — SODIUM CHLORIDE 9 MG/ML
250 INJECTION, SOLUTION INTRAVENOUS ONCE
Status: CANCELLED | OUTPATIENT
Start: 2019-03-19

## 2019-03-19 RX ORDER — SODIUM CHLORIDE 9 MG/ML
250 INJECTION, SOLUTION INTRAVENOUS ONCE
Status: CANCELLED | OUTPATIENT
Start: 2019-03-20

## 2019-03-19 RX ADMIN — FERUMOXYTOL 510 MG: 510 INJECTION INTRAVENOUS at 15:26

## 2019-03-26 ENCOUNTER — INFUSION (OUTPATIENT)
Dept: ONCOLOGY | Facility: HOSPITAL | Age: 69
End: 2019-03-26

## 2019-03-26 VITALS
DIASTOLIC BLOOD PRESSURE: 94 MMHG | SYSTOLIC BLOOD PRESSURE: 173 MMHG | RESPIRATION RATE: 18 BRPM | TEMPERATURE: 97.7 F | HEART RATE: 81 BPM

## 2019-03-26 DIAGNOSIS — IMO0001 IRON AND ITS COMPOUNDS CAUSING ADVERSE EFFECT IN THERAPEUTIC USE, SUBSEQUENT ENCOUNTER: ICD-10-CM

## 2019-03-26 DIAGNOSIS — D50.8 IRON DEFICIENCY ANEMIA SECONDARY TO INADEQUATE DIETARY IRON INTAKE: Primary | ICD-10-CM

## 2019-03-26 PROCEDURE — 25010000002 FERUMOXYTOL 510 MG/17ML SOLUTION 510 MG VIAL: Performed by: NURSE PRACTITIONER

## 2019-03-26 PROCEDURE — 96375 TX/PRO/DX INJ NEW DRUG ADDON: CPT

## 2019-03-26 PROCEDURE — 96374 THER/PROPH/DIAG INJ IV PUSH: CPT

## 2019-03-26 RX ORDER — SODIUM CHLORIDE 9 MG/ML
250 INJECTION, SOLUTION INTRAVENOUS ONCE
Status: COMPLETED | OUTPATIENT
Start: 2019-03-26 | End: 2019-03-26

## 2019-03-26 RX ADMIN — FERUMOXYTOL 510 MG: 510 INJECTION INTRAVENOUS at 11:22

## 2019-03-26 RX ADMIN — SODIUM CHLORIDE 250 ML: 9 INJECTION, SOLUTION INTRAVENOUS at 11:39

## 2019-12-31 ENCOUNTER — TELEPHONE (OUTPATIENT)
Dept: ONCOLOGY | Facility: CLINIC | Age: 69
End: 2019-12-31

## 2019-12-31 ENCOUNTER — APPOINTMENT (OUTPATIENT)
Dept: LAB | Facility: HOSPITAL | Age: 69
End: 2019-12-31

## 2019-12-31 ENCOUNTER — OFFICE VISIT (OUTPATIENT)
Dept: ONCOLOGY | Facility: CLINIC | Age: 69
End: 2019-12-31

## 2019-12-31 VITALS
HEART RATE: 97 BPM | SYSTOLIC BLOOD PRESSURE: 183 MMHG | WEIGHT: 185 LBS | BODY MASS INDEX: 33.83 KG/M2 | TEMPERATURE: 97 F | DIASTOLIC BLOOD PRESSURE: 110 MMHG | OXYGEN SATURATION: 92 %

## 2019-12-31 DIAGNOSIS — Z85.3 HISTORY OF BREAST CANCER: ICD-10-CM

## 2019-12-31 DIAGNOSIS — D50.8 IRON DEFICIENCY ANEMIA SECONDARY TO INADEQUATE DIETARY IRON INTAKE: Primary | ICD-10-CM

## 2019-12-31 LAB
BASOPHILS # BLD AUTO: 0.08 10*3/MM3 (ref 0–0.2)
BASOPHILS NFR BLD AUTO: 1.4 % (ref 0–1.5)
DEPRECATED RDW RBC AUTO: 46.3 FL (ref 37–54)
EOSINOPHIL # BLD AUTO: 0.43 10*3/MM3 (ref 0–0.4)
EOSINOPHIL NFR BLD AUTO: 7.7 % (ref 0.3–6.2)
ERYTHROCYTE [DISTWIDTH] IN BLOOD BY AUTOMATED COUNT: 13.3 % (ref 12.3–15.4)
FERRITIN SERPL-MCNC: 71.58 NG/ML (ref 13–150)
HCT VFR BLD AUTO: 45.9 % (ref 34–46.6)
HGB BLD-MCNC: 15 G/DL (ref 12–15.9)
IMM GRANULOCYTES # BLD AUTO: 0.01 10*3/MM3 (ref 0–0.05)
IMM GRANULOCYTES NFR BLD AUTO: 0.2 % (ref 0–0.5)
IRON 24H UR-MRATE: 145 MCG/DL (ref 37–145)
IRON SATN MFR SERPL: 35 % (ref 20–50)
LYMPHOCYTES # BLD AUTO: 1.74 10*3/MM3 (ref 0.7–3.1)
LYMPHOCYTES NFR BLD AUTO: 31.4 % (ref 19.6–45.3)
MCH RBC QN AUTO: 30.8 PG (ref 26.6–33)
MCHC RBC AUTO-ENTMCNC: 32.7 G/DL (ref 31.5–35.7)
MCV RBC AUTO: 94.3 FL (ref 79–97)
MONOCYTES # BLD AUTO: 0.49 10*3/MM3 (ref 0.1–0.9)
MONOCYTES NFR BLD AUTO: 8.8 % (ref 5–12)
NEUTROPHILS # BLD AUTO: 2.8 10*3/MM3 (ref 1.7–7)
NEUTROPHILS NFR BLD AUTO: 50.5 % (ref 42.7–76)
NRBC BLD AUTO-RTO: 0 /100 WBC (ref 0–0.2)
PLATELET # BLD AUTO: 209 10*3/MM3 (ref 140–450)
PMV BLD AUTO: 10.4 FL (ref 6–12)
RBC # BLD AUTO: 4.87 10*6/MM3 (ref 3.77–5.28)
TIBC SERPL-MCNC: 416 MCG/DL (ref 298–536)
TRANSFERRIN SERPL-MCNC: 279 MG/DL (ref 200–360)
WBC NRBC COR # BLD: 5.55 10*3/MM3 (ref 3.4–10.8)

## 2019-12-31 PROCEDURE — 99214 OFFICE O/P EST MOD 30 MIN: CPT | Performed by: INTERNAL MEDICINE

## 2019-12-31 PROCEDURE — 85025 COMPLETE CBC W/AUTO DIFF WBC: CPT | Performed by: INTERNAL MEDICINE

## 2019-12-31 PROCEDURE — 36415 COLL VENOUS BLD VENIPUNCTURE: CPT | Performed by: INTERNAL MEDICINE

## 2019-12-31 PROCEDURE — 84466 ASSAY OF TRANSFERRIN: CPT | Performed by: INTERNAL MEDICINE

## 2019-12-31 PROCEDURE — 82728 ASSAY OF FERRITIN: CPT | Performed by: INTERNAL MEDICINE

## 2019-12-31 PROCEDURE — 83540 ASSAY OF IRON: CPT | Performed by: INTERNAL MEDICINE

## 2019-12-31 NOTE — PROGRESS NOTES
PROBLEM LIST:  1.  Recurring iron deficiency anemia with intolerance of oral iron.   2.  History of breast cancer.   3.  History of melanoma.   4.  Coronary artery disease with prior stents.   5.  History of pulmonary embolus.   6.  Hypertension.     Subjective     CHIEF COMPLAINT: concern about right breast    HISTORY OF PRESENT ILLNESS:   Bailey Howe returns for follow-up.   She has been followed mainly for chronic iron deficiency anemia for which she receives IV iron about twice a year.    She has a remote history of left breast cancer which was diagnosed in 1991.  She had a mastectomy with 28 lymph nodes removed.  She says 3 or 4 lymph nodes were involved.  She received adjuvant chemotherapy.  Recently she has noticed that the right breast feels heavier and she has noticed some nodularity at about 9:00.  She does have an implant on this side.  She had a mammogram in September which noted some calcifications, and 6-month follow-up with magnification views was recommended.  She overall is feeling well currently.    Past Medical History, Past Surgical History, Social History, Family History have been reviewed and are without significant changes except as mentioned.    Review of Systems   A comprehensive 14 point review of systems was performed and was negative except as mentioned.    Medications:  The current medication list was reviewed in the EMR    ALLERGIES:  No Known Allergies    Objective      BP (!) 183/110   Pulse 97   Temp 97 °F (36.1 °C)   Wt 83.9 kg (185 lb)   SpO2 92%   BMI 33.83 kg/m²      Performance Status: 0    General: well appearing female in no acute distress  Neuro: alert and oriented  HEENT: sclera anicteric, oropharynx clear  Lymphatics: no cervical, supraclavicular, or axillary adenopathy  Cardiovascular: regular rate and rhythm, no murmurs  Breast: Status post left mastectomy with implant reconstruction.  Right breast has an implant in place.  Some vague nodularity at 9:00 in a  linear pattern.  No firm mass.  Lungs: clear to auscultation bilaterally  Abdomen: soft, nontender, nondistended.  No palpable organomegaly  Extremeties: no lower extremity edema  Skin: no rashes, lesions, bruising, or petechiae  Psych: mood and affect appropriate          Assessment/Plan   Bailey Howe is a 69 y.o. year old female with a remote history of left breast cancer who presents with new right breast symptoms.  Because of her implant and prior surgery, there is some scar tissue in the breast, and exam and mammogram imaging may be somewhat limited by the implant.  I recommend a breast MRI for further evaluation.  Given her prior cancer history, she does have an increased risk for contralateral cancer.  I will contact her once these results are available.  I would still recommend that she get her planned repeat 6-month mammogram in the spring.    Iron deficiency anemia: Check labs today.  We will repeat IV iron if indicated.    Follow-up in 6 months.                      I spent 25 minutes with the patient. I spent > 50% percent of this time counseling and discussing prognosis, diagnostic testing, evaluation, current status and management.        Marlen Carranza MD  [facility] Hematology and Oncology    12/31/2019          CC:

## 2019-12-31 NOTE — TELEPHONE ENCOUNTER
Called patient per Dr. Carranza to let her know that her hemoglobin and iron levels are normal and there is no need for iron at this time.  I left voicemail on patient phone.     97

## 2020-01-29 ENCOUNTER — HOSPITAL ENCOUNTER (OUTPATIENT)
Dept: MRI IMAGING | Facility: HOSPITAL | Age: 70
Discharge: HOME | End: 2020-01-29
Admitting: INTERNAL MEDICINE

## 2020-01-29 DIAGNOSIS — Z85.3 HISTORY OF BREAST CANCER: ICD-10-CM

## 2020-01-29 LAB — CREAT BLDA-MCNC: 0.9 MG/DL (ref 0.6–1.3)

## 2020-01-29 PROCEDURE — 77049 MRI BREAST C-+ W/CAD BI: CPT | Performed by: RADIOLOGY

## 2020-01-29 PROCEDURE — C8937 CAD BREAST MRI: HCPCS

## 2020-01-29 PROCEDURE — 82565 ASSAY OF CREATININE: CPT

## 2020-01-29 PROCEDURE — A9577 INJ MULTIHANCE: HCPCS | Performed by: INTERNAL MEDICINE

## 2020-01-29 PROCEDURE — C8908 MRI W/O FOL W/CONT, BREAST,: HCPCS

## 2020-01-29 PROCEDURE — 0 GADOBENATE DIMEGLUMINE 529 MG/ML SOLUTION: Performed by: INTERNAL MEDICINE

## 2020-01-29 RX ADMIN — GADOBENATE DIMEGLUMINE 17 ML: 529 INJECTION, SOLUTION INTRAVENOUS at 14:59

## 2020-02-03 ENCOUNTER — TELEPHONE (OUTPATIENT)
Dept: MRI IMAGING | Facility: HOSPITAL | Age: 70
End: 2020-02-03

## 2020-02-03 NOTE — TELEPHONE ENCOUNTER
Called pt with Breast MRI results. Recommended 2nd look US and Dx MMG scheduled for 2/20 at the [address] [facility] for an 8:00 MMG and 2nd look to follow. Pt is on Baby aspirin but , if approved by her MD, is to come off 4 days prior. Pt advised to bring a . Pt expressed understanding and was encouraged to call with any other questions or concerns.

## 2020-02-20 ENCOUNTER — HOSPITAL ENCOUNTER (OUTPATIENT)
Dept: MAMMOGRAPHY | Facility: HOSPITAL | Age: 70
Discharge: HOME | End: 2020-02-20

## 2020-02-20 ENCOUNTER — HOSPITAL ENCOUNTER (OUTPATIENT)
Dept: ULTRASOUND IMAGING | Facility: HOSPITAL | Age: 70
Discharge: HOME | End: 2020-02-20

## 2020-02-20 ENCOUNTER — HOSPITAL ENCOUNTER (OUTPATIENT)
Dept: ULTRASOUND IMAGING | Facility: HOSPITAL | Age: 70
Discharge: HOME | End: 2020-02-20
Admitting: RADIOLOGY

## 2020-02-20 DIAGNOSIS — R92.8 ABNORMAL MAGNETIC RESONANCE IMAGING OF BREAST: ICD-10-CM

## 2020-02-20 DIAGNOSIS — Z85.3 PERSONAL HISTORY OF MALIGNANT NEOPLASM OF BREAST: ICD-10-CM

## 2020-02-20 PROCEDURE — 77065 DX MAMMO INCL CAD UNI: CPT | Performed by: RADIOLOGY

## 2020-02-20 PROCEDURE — A4648 IMPLANTABLE TISSUE MARKER: HCPCS

## 2020-02-20 PROCEDURE — 25010000003 LIDOCAINE 1 % SOLUTION: Performed by: RADIOLOGY

## 2020-02-20 PROCEDURE — 88305 TISSUE EXAM BY PATHOLOGIST: CPT | Performed by: INTERNAL MEDICINE

## 2020-02-20 PROCEDURE — 88360 TUMOR IMMUNOHISTOCHEM/MANUAL: CPT | Performed by: INTERNAL MEDICINE

## 2020-02-20 PROCEDURE — 19084 BX BREAST ADD LESION US IMAG: CPT | Performed by: RADIOLOGY

## 2020-02-20 PROCEDURE — 77065 DX MAMMO INCL CAD UNI: CPT

## 2020-02-20 PROCEDURE — 19083 BX BREAST 1ST LESION US IMAG: CPT | Performed by: RADIOLOGY

## 2020-02-20 RX ORDER — LIDOCAINE HYDROCHLORIDE 10 MG/ML
5 INJECTION, SOLUTION INFILTRATION; PERINEURAL ONCE
Status: COMPLETED | OUTPATIENT
Start: 2020-02-20 | End: 2020-02-20

## 2020-02-20 RX ORDER — LIDOCAINE HYDROCHLORIDE AND EPINEPHRINE 10; 10 MG/ML; UG/ML
10 INJECTION, SOLUTION INFILTRATION; PERINEURAL ONCE
Status: COMPLETED | OUTPATIENT
Start: 2020-02-20 | End: 2020-02-20

## 2020-02-20 RX ADMIN — LIDOCAINE HYDROCHLORIDE,EPINEPHRINE BITARTRATE 1 ML: 10; .01 INJECTION, SOLUTION INFILTRATION; PERINEURAL at 11:04

## 2020-02-20 RX ADMIN — LIDOCAINE HYDROCHLORIDE 2 ML: 10 INJECTION, SOLUTION INFILTRATION; PERINEURAL at 11:03

## 2020-02-20 RX ADMIN — LIDOCAINE HYDROCHLORIDE,EPINEPHRINE BITARTRATE 2 ML: 10; .01 INJECTION, SOLUTION INFILTRATION; PERINEURAL at 11:10

## 2020-02-20 RX ADMIN — LIDOCAINE HYDROCHLORIDE 1 ML: 10 INJECTION, SOLUTION INFILTRATION; PERINEURAL at 11:09

## 2020-02-20 NOTE — PROGRESS NOTES
Alert and orientated. Denies discomfort. No active bleeding. Steri-strips intact, gauze dressing applied. Ice packs given. Verbalizes and demonstrates understanding of post-care instructions and written copy given.

## 2020-02-22 LAB
CYTO UR: NORMAL
CYTO UR: NORMAL
LAB AP CASE REPORT: NORMAL
LAB AP CASE REPORT: NORMAL
LAB AP CLINICAL INFORMATION: NORMAL
LAB AP CLINICAL INFORMATION: NORMAL
LAB AP DIAGNOSIS COMMENT: NORMAL
LAB AP DIAGNOSIS COMMENT: NORMAL
LAB AP SPECIAL STAINS: NORMAL
LAB AP SPECIAL STAINS: NORMAL
PATH REPORT.FINAL DX SPEC: NORMAL
PATH REPORT.FINAL DX SPEC: NORMAL
PATH REPORT.GROSS SPEC: NORMAL
PATH REPORT.GROSS SPEC: NORMAL

## 2020-02-24 ENCOUNTER — TELEPHONE (OUTPATIENT)
Dept: MAMMOGRAPHY | Facility: HOSPITAL | Age: 70
End: 2020-02-24

## 2020-02-24 NOTE — TELEPHONE ENCOUNTER
Referring provider's office notified pathology returned as cancer & patient will be notified. Pt notified of pathology results and recommendation. Verbalizes understanding. Denies discomfort. Denies signs and symptoms of infection.   Patient desires Dr MICHELLE Butt for surgical consult. Patient will be notified of appointment. Told to bring photo ID, list of prescription & OTC medications and insurance information. Reviewed what would be discussed at surgical consult visit, including detailed explanation of pathology report & imaging reports; treatment options & pros/cons, availability of nurse navigator. Patient encouraged to call back or contact MATIAS Clancy RN, OCN, Breast Navigator, with any questions or concerns.  Pt information sent to MATIAS Clancy RN, OCN, Breast Navigator.  Breast cancer information packet offered and accepted. Patient verbalized understanding.

## 2020-02-25 ENCOUNTER — TELEPHONE (OUTPATIENT)
Dept: ONCOLOGY | Facility: CLINIC | Age: 70
End: 2020-02-25

## 2020-02-25 ENCOUNTER — TELEPHONE (OUTPATIENT)
Dept: MAMMOGRAPHY | Facility: HOSPITAL | Age: 70
End: 2020-02-25

## 2020-02-25 NOTE — TELEPHONE ENCOUNTER
----- Message from Deepthi Varner sent at 2/24/2020  2:59 PM EST -----  Regarding: CATARINO  Hello,    We have received a call from Lydia in the breast center. She stated that they have diagnosed this pt with breast cancer. The pt does not know yet. They will be contacting her and will ask what surgeon she wants to be set up with, but they wanted to make sure we were aware. Their extension is [phone] if you need to reach Lydia.    Thanks,  ~Chantal.

## 2020-03-02 ENCOUNTER — TELEPHONE (OUTPATIENT)
Dept: MAMMOGRAPHY | Facility: HOSPITAL | Age: 70
End: 2020-03-02

## 2020-03-02 NOTE — TELEPHONE ENCOUNTER
Patient called in with questions about her appointment with Dr. MICHELLE Butt tomorrow.  Told to bring photo ID, list of prescription & OTC medications and insurance information. Reviewed what would be discussed at surgical consult visit, including detailed explanation of pathology report & imaging reports; treatment options & pros/cons, availability of nurse navigator. Patient encouraged to call back or contact MATIAS Clancy RN, OCN, Breast Navigator, with any questions or concerns. Patient verbalized understanding.

## 2020-03-03 ENCOUNTER — APPOINTMENT (OUTPATIENT)
Dept: LAB | Facility: HOSPITAL | Age: 70
End: 2020-03-03

## 2020-03-03 ENCOUNTER — CLINICAL SUPPORT (OUTPATIENT)
Dept: GENETICS | Facility: HOSPITAL | Age: 70
End: 2020-03-03

## 2020-03-03 DIAGNOSIS — Z13.79 GENETIC TESTING: Primary | ICD-10-CM

## 2020-03-03 DIAGNOSIS — Z85.3 HISTORY OF LEFT BREAST CANCER: ICD-10-CM

## 2020-03-03 DIAGNOSIS — C50.911 MALIGNANT NEOPLASM OF RIGHT BREAST IN FEMALE, ESTROGEN RECEPTOR POSITIVE, UNSPECIFIED SITE OF BREAST (HCC): ICD-10-CM

## 2020-03-03 DIAGNOSIS — Z17.0 MALIGNANT NEOPLASM OF RIGHT BREAST IN FEMALE, ESTROGEN RECEPTOR POSITIVE, UNSPECIFIED SITE OF BREAST (HCC): ICD-10-CM

## 2020-03-03 DIAGNOSIS — Z80.41 FAMILY HISTORY OF OVARIAN CANCER: ICD-10-CM

## 2020-03-03 PROCEDURE — 96040: CPT | Performed by: GENETIC COUNSELOR, MS

## 2020-03-04 NOTE — PROGRESS NOTES
Bailey Howe, a 69-year-old female, was seen for genetic counseling due to a personal history of breast cancer.  Ms. 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 She is making a surgical decision. She has a previous history of a left breast cancer diagnosed at age 40, and she had a left mastectomy at that time. Ms. Howe also has a personal history of melanoma diagnosed at age 39 on her upper left thigh. She retains her uterus and ovaries. She was interested in discussing her risk for a hereditary cancer syndrome.  Ms. Howe was interested in pursuing a multi-gene panel to evaluate her risk of cancer, therefore the CancerNext panel was ordered through [employer] which analyzes BRCA1/2 and 32 additional genes associated with an increased cancer risk. The genes on this panel include APC, KEE, BARD1, BMPR1A, BRCA1, BRCA2, BRIP1, CDH1, CDK4, CDKN2A, CHEK2, DICER1, EPCAM, GREM1, HOXB13, MLH1, MRE11A, MSH2, MSH6, MUTYH, NBN, NF1, PALB2, PMS2, POLD1, POLE, PTEN, RAD50, RAD51C, RAD51D, SMAD4, SMARCA4, STK11, and TP53. Results are expected within 2-3 weeks.     PERTINENT FAMILY HISTORY: (See attached pedigree)   Brother: Glioblastoma, 50  Mat. Aunt: Ovarian cancer, 52  Mat. Aunt: Lung cancer, 49  Mat. Aunt: Stomach or esophageal cancer, 60s    We do not have medical records regarding any of these diagnoses.      RISK ASSESSMENT:  Ms. Howe’s personal and family history of cancer led to concern for a hereditary cancer syndrome. We discussed BRCA1/2 testing as well as the option of pursuing a panel that would test for other genes known to impact cancer risk in addition to BRCA1/2.   Ms. Howe clearly meets NCCN guidelines criteria for BRCA1/2 testing based on her personal history of contralateral breast cancer and her family history of ovarian cancer. These risk assessments are based on the family history information provided at the time of the appointment, and could change in the  future should new information be obtained.    GENETIC COUNSELING (30 minutes):  We reviewed the family history information in detail. Cases of cancer follow three general patterns: sporadic, familial, and hereditary.  While most cancer is sporadic, some cases appear to occur in family clusters.  These cases are said to be familial and account for 10-20% of cancer cases.  Familial cases may be due to a combination of shared genes and environmental factors among family members.  In even fewer families, the cancer is said to be inherited, and the genes responsible for the cancer are known.      Family histories typical of hereditary cancer syndromes usually include multiple first- and second-degree relatives diagnosed with cancer types that define a syndrome.  These cases tend to be diagnosed at younger-than-expected ages and can be bilateral or multifocal.  The cancer in these families follows an autosomal dominant inheritance pattern, which indicates the likely presence of a mutation in a cancer susceptibility gene.  Children and siblings of an individual believed to carry this mutation have a 50% chance of inheriting that mutation, thereby inheriting the increased risk to develop cancer.  These mutations can be passed down from the maternal or the paternal lineage.    Hereditary breast cancer accounts for 5-10% of all cases of breast cancer.  A significant proportion of hereditary breast and ovarian cancer can be attributed to mutations in the BRCA1 and BRCA2 genes.  Mutations in these genes confer an increased risk for breast cancer, ovarian cancer, male breast cancer, prostate cancer, and pancreatic cancer. Women with a BRCA1 or BRCA2 mutation who have already been diagnosed with breast cancer have a 40-60% lifetime risk of a second breast cancer. Women with a BRCA1 or BRCA2 mutation have up to a 44% risk of ovarian cancer.      There are other genes that are known to be associated with an increased risk for  cancer.  Some of these genes have well defined cancer risks and established management guidelines.  Other genes that can be tested for have been more recently described, and there may be less data regarding the risks and therefore may not have established management guidelines. We discussed these limitations at length.  Based on Ms. Howe’s desire to get as much information as possible regarding her personal risks and potential risks for her family, she opted to pursue testing through a panel that would look at several other genes known to increase the risk for cancer.    GENETIC TESTING:  The risks, benefits and limitations of genetic testing and implications for clinical management following testing were reviewed.  DNA test results can influence decisions regarding screening, prevention and surgical management.  Genetic testing can have significant psychological implications for both individuals and families.  Also discussed was the possibility of employment and insurance discrimination based on genetic test results and the laws in place to prevent this (ERWIN).    We discussed panel testing, which would involve testing for BRCA1/2 as well as 32 additional genes that are associated with increased cancer risk. The benefits and limitations of genetic testing were discussed and Ms. Howe decided to pursue testing via the panel. The implications of a positive or negative test result were discussed. We discussed the possibility that, in some cases, genetic test results may be informative or may be ambiguous due to the identification of a genetic variant. These variants may or may not be associated with an increased cancer risk.  With multigene panel testing, it is not uncommon for a variant of uncertain significance (VUS) to be identified.  If a VUS is identified, testing family members is typically not recommended and screening recommendations are made based on the family history.  The laboratories that perform genetic  testing work to reclassify the VUS and send out an amended report if and when a VUS is reclassified.  The majority of variant findings are ultimately reclassified to a negative result.  Given her personal and family history, a negative test result would not eliminate all cancer risk to her relatives, although the risk would not be as high as it would with positive genetic testing.      PLAN: Genetic testing via the CancerNext panel through [employer] was ordered and results are expected within 2-3 weeks. Ms. Howe is welcome to contact us in the meantime with any questions she may have in the meantime at [phone].      Tata Mckenna MS, [facility], [facility]  Licensed Certified Genetic Counselor

## 2020-03-13 ENCOUNTER — NURSE NAVIGATOR (OUTPATIENT)
Dept: ONCOLOGY | Facility: CLINIC | Age: 70
End: 2020-03-13

## 2020-03-13 NOTE — PROGRESS NOTES
I saw patient with Dr. Butt - she reviewed the pathology report and surgical options. Patient wants BCT - MRI ordered - educational and supportive materials reviewed and given

## 2020-03-19 ENCOUNTER — DOCUMENTATION (OUTPATIENT)
Dept: GENETICS | Facility: HOSPITAL | Age: 70
End: 2020-03-19

## 2020-03-19 NOTE — PROGRESS NOTES
Bailey Howe, a 69-year-old female, was seen for genetic counseling due to a personal history of breast cancer.  Ms. Howe was recently diagnosed with an ER/IA positive DCIS of the right breast at age 69. She is making a surgical decision. She has a previous history of a left breast cancer diagnosed at age 40, and she had a left mastectomy at that time. Ms. Howe also has a personal history of melanoma diagnosed at age 39 on her upper left thigh. She retains her uterus and ovaries. She was interested in discussing her risk for a hereditary cancer syndrome.  Ms. Howe was interested in pursuing a multi-gene panel to evaluate her risk of cancer, therefore the CancerNext panel was ordered through [employer] which analyzes BRCA1/2 and 32 additional genes associated with an increased cancer risk. The genes on this panel include APC, KEE, BARD1, BMPR1A, BRCA1, BRCA2, BRIP1, CDH1, CDK4, CDKN2A, CHEK2, DICER1, EPCAM, GREM1, HOXB13, MLH1, MRE11A, MSH2, MSH6, MUTYH, NBN, NF1, PALB2, PMS2, POLD1, POLE, PTEN, RAD50, RAD51C, RAD51D, SMAD4, SMARCA4, STK11, and TP53. Genetic testing was negative for pathogenic mutations in BRCA1/2 and 32 additional genes on the CancerNext panel.  These normal results were discussed with Ms. Howe by telephone on 3/19/2020.      PERTINENT FAMILY HISTORY: (See attached pedigree)   Brother: Glioblastoma, 50  Mat. Aunt: Ovarian cancer, 52  Mat. Aunt: Lung cancer, 49  Mat. Aunt: Stomach or esophageal cancer, 60s    We do not have medical records regarding any of these diagnoses.      RISK ASSESSMENT:  Ms. Howe’s personal and family history of cancer led to concern for a hereditary cancer syndrome. We discussed BRCA1/2 testing as well as the option of pursuing a panel that would test for other genes known to impact cancer risk in addition to BRCA1/2.   Ms. Howe clearly meets NCCN guidelines criteria for BRCA1/2 testing based on her personal history of contralateral breast cancer and her family history of  ovarian cancer. These risk assessments are based on the family history information provided at the time of the appointment, and could change in the future should new information be obtained.    GENETIC COUNSELING:  We reviewed the family history information in detail. Cases of cancer follow three general patterns: sporadic, familial, and hereditary.  While most cancer is sporadic, some cases appear to occur in family clusters.  These cases are said to be familial and account for 10-20% of cancer cases.  Familial cases may be due to a combination of shared genes and environmental factors among family members.  In even fewer families, the cancer is said to be inherited, and the genes responsible for the cancer are known.      Family histories typical of hereditary cancer syndromes usually include multiple first- and second-degree relatives diagnosed with cancer types that define a syndrome.  These cases tend to be diagnosed at younger-than-expected ages and can be bilateral or multifocal.  The cancer in these families follows an autosomal dominant inheritance pattern, which indicates the likely presence of a mutation in a cancer susceptibility gene.  Children and siblings of an individual believed to carry this mutation have a 50% chance of inheriting that mutation, thereby inheriting the increased risk to develop cancer.  These mutations can be passed down from the maternal or the paternal lineage.    Hereditary breast cancer accounts for 5-10% of all cases of breast cancer.  A significant proportion of hereditary breast and ovarian cancer can be attributed to mutations in the BRCA1 and BRCA2 genes.  Mutations in these genes confer an increased risk for breast cancer, ovarian cancer, male breast cancer, prostate cancer, and pancreatic cancer. Women with a BRCA1 or BRCA2 mutation who have already been diagnosed with breast cancer have a 40-60% lifetime risk of a second breast cancer. Women with a BRCA1 or BRCA2  mutation have up to a 44% risk of ovarian cancer.      There are other genes that are known to be associated with an increased risk for cancer.  Some of these genes have well defined cancer risks and established management guidelines.  Other genes that can be tested for have been more recently described, and there may be less data regarding the risks and therefore may not have established management guidelines. We discussed these limitations at length.  Based on Ms. Howe’s desire to get as much information as possible regarding her personal risks and potential risks for her family, she opted to pursue testing through a panel that would look at several other genes known to increase the risk for cancer.    GENETIC TESTING:  The risks, benefits and limitations of genetic testing and implications for clinical management following testing were reviewed.  DNA test results can influence decisions regarding screening, prevention and surgical management.  Genetic testing can have significant psychological implications for both individuals and families.  Also discussed was the possibility of employment and insurance discrimination based on genetic test results and the laws in place to prevent this (ERWIN).    We discussed panel testing, which would involve testing for BRCA1/2 as well as 32 additional genes that are associated with increased cancer risk. The benefits and limitations of genetic testing were discussed and Ms. Howe decided to pursue testing via the panel. The implications of a positive or negative test result were discussed. We discussed the possibility that, in some cases, genetic test results may be informative or may be ambiguous due to the identification of a genetic variant. These variants may or may not be associated with an increased cancer risk.  With multigene panel testing, it is not uncommon for a variant of uncertain significance (VUS) to be identified.  If a VUS is identified, testing family members  is typically not recommended and screening recommendations are made based on the family history.  The laboratories that perform genetic testing work to reclassify the VUS and send out an amended report if and when a VUS is reclassified.  The majority of variant findings are ultimately reclassified to a negative result.  Given her personal and family history, a negative test result would not eliminate all cancer risk to her relatives, although the risk would not be as high as it would with positive genetic testing.      TEST RESULTS:  Genetic testing was negative for known pathogenic mutations by sequencing, rearrangement testing, and RNA analysis for the genes on the CancerNext panel (see attached results).    This negative result greatly lowers but does not eliminate the risk of a hereditary cancer syndrome for Ms. Howe.  This assessment is based on the information provided at time of consultation.    CANCER SCREENING: Ms. Howe’s surveillance and management should be determined by her oncology team. Despite the negative genetic test results, Ms. Howe’s female relatives may have a somewhat increased lifetime risk for breast cancer based on family history.  Given the increased risk, options available to individuals with a high lifetime risk for breast cancer were briefly discussed.  This includes increased surveillance and chemoprevention.     Surveillance for individuals with a high lifetime risk of breast cancer (>20%, versus the average risk of 12%), based on NCCN guidelines, would consist of semi-annual clinical breast exams and monthly self-breast exams starting by age 18 and annual mammography starting 10 years younger than the earliest diagnosis in a close relative, or starting by age 40, whichever is earliest.  According to an American Cancer Society expert panel, annual breast MRI should be offered to women whose lifetime risk of breast cancer is 20-25 percent or more, also starting by age 40 or earlier if  indicated by family history.  Female relatives could have a risk assessment performed using a family history-based model, such as the Tyrer-Cuzick model, to determine their individual risks.      PLAN: Genetic counseling remains available to Ms. Howe and her family. She is welcome to contact our office with any questions or concerns at [phone].      Tata Mckenna MS, [facility] [place], [facility]  Licensed Certified Genetic Counselor       Cc: Bailey Butt MD

## 2020-05-21 ENCOUNTER — TELEPHONE (OUTPATIENT)
Dept: ONCOLOGY | Facility: CLINIC | Age: 70
End: 2020-05-21

## 2020-05-21 NOTE — TELEPHONE ENCOUNTER
Rebecca - pt called and is requesting a call back to discuss her treatment plan.  She stated she is 'ready to get things rolling so she can live her life....'  She has seen Dr. Barbara Butt but would like to discuss possibly seeing a surgeon closer to home in [facility].  Please call pt back to discuss options, may call either:    H:  [phone]  C:  [phone]    Thanks!

## 2020-05-21 NOTE — TELEPHONE ENCOUNTER
I talked with Dr Carranza and she said that given the patients complications with implants and needing plastics, she would prefer that the patient continue with Dr Barbara Butt and plastics here.  Dr. Carranza talked with Dr. Butt and Dr. Butt said that someone from their office would contact the patient.  They had met with her in Mid March and then with COVID, patient did not follow up.  I called the patient back and told her the above information and that someone from Dr. Smallwood office would be in contact with her to discuss surgical options.  Pt verbalized understanding.

## 2020-05-22 ENCOUNTER — NURSE NAVIGATOR (OUTPATIENT)
Dept: ONCOLOGY | Facility: CLINIC | Age: 70
End: 2020-05-22

## 2020-05-22 NOTE — PROGRESS NOTES
I called patient after speaking to Dr Butt- Patient wishes for BCT and to have surgery closer to home ([place]). Dr. Butt wants to see patient to review all imaging to determine if BCT is best plan of care and then she can refer patient to [facility] if appropriate. Patient is willing to proceed with this and will see Dr. Butt on Tuesday 5/26/20 @ 10AM.

## 2020-06-02 ENCOUNTER — TRANSCRIBE ORDERS (OUTPATIENT)
Dept: ADMINISTRATIVE | Facility: HOSPITAL | Age: 70
End: 2020-06-02

## 2020-06-02 DIAGNOSIS — D05.11 INTRADUCTAL CARCINOMA IN SITU OF RIGHT BREAST: Primary | ICD-10-CM

## 2020-06-07 ENCOUNTER — APPOINTMENT (OUTPATIENT)
Dept: PREADMISSION TESTING | Facility: HOSPITAL | Age: 70
End: 2020-06-07

## 2020-06-07 VITALS — HEIGHT: 63 IN | WEIGHT: 185.85 LBS | BODY MASS INDEX: 32.93 KG/M2

## 2020-06-07 LAB
ALBUMIN SERPL-MCNC: 4.4 G/DL (ref 3.5–5.2)
ALBUMIN/GLOB SERPL: 1.7 G/DL
ALP SERPL-CCNC: 112 U/L (ref 39–117)
ALT SERPL W P-5'-P-CCNC: 106 U/L (ref 1–33)
ANION GAP SERPL CALCULATED.3IONS-SCNC: 15 MMOL/L (ref 5–15)
AST SERPL-CCNC: 89 U/L (ref 1–32)
BILIRUB SERPL-MCNC: 0.4 MG/DL (ref 0.2–1.2)
BUN BLD-MCNC: 22 MG/DL (ref 8–23)
BUN/CREAT SERPL: 16.3 (ref 7–25)
CALCIUM SPEC-SCNC: 10 MG/DL (ref 8.6–10.5)
CHLORIDE SERPL-SCNC: 101 MMOL/L (ref 98–107)
CO2 SERPL-SCNC: 23 MMOL/L (ref 22–29)
CREAT BLD-MCNC: 1.35 MG/DL (ref 0.57–1)
DEPRECATED RDW RBC AUTO: 49.9 FL (ref 37–54)
ERYTHROCYTE [DISTWIDTH] IN BLOOD BY AUTOMATED COUNT: 13.8 % (ref 12.3–15.4)
GFR SERPL CREATININE-BSD FRML MDRD: 39 ML/MIN/1.73
GLOBULIN UR ELPH-MCNC: 2.6 GM/DL
GLUCOSE BLD-MCNC: 163 MG/DL (ref 65–99)
HCT VFR BLD AUTO: 45.8 % (ref 34–46.6)
HGB BLD-MCNC: 15 G/DL (ref 12–15.9)
MCH RBC QN AUTO: 31.6 PG (ref 26.6–33)
MCHC RBC AUTO-ENTMCNC: 32.8 G/DL (ref 31.5–35.7)
MCV RBC AUTO: 96.6 FL (ref 79–97)
PLATELET # BLD AUTO: 206 10*3/MM3 (ref 140–450)
PMV BLD AUTO: 10.2 FL (ref 6–12)
POTASSIUM BLD-SCNC: 4 MMOL/L (ref 3.5–5.2)
PROT SERPL-MCNC: 7 G/DL (ref 6–8.5)
RBC # BLD AUTO: 4.74 10*6/MM3 (ref 3.77–5.28)
SODIUM BLD-SCNC: 139 MMOL/L (ref 136–145)
WBC NRBC COR # BLD: 5.99 10*3/MM3 (ref 3.4–10.8)

## 2020-06-07 PROCEDURE — 85027 COMPLETE CBC AUTOMATED: CPT | Performed by: SURGERY

## 2020-06-07 PROCEDURE — 93010 ELECTROCARDIOGRAM REPORT: CPT | Performed by: INTERNAL MEDICINE

## 2020-06-07 PROCEDURE — 93005 ELECTROCARDIOGRAM TRACING: CPT

## 2020-06-07 PROCEDURE — U0002 COVID-19 LAB TEST NON-CDC: HCPCS

## 2020-06-07 PROCEDURE — U0004 COV-19 TEST NON-CDC HGH THRU: HCPCS

## 2020-06-07 PROCEDURE — 36415 COLL VENOUS BLD VENIPUNCTURE: CPT

## 2020-06-07 PROCEDURE — 80053 COMPREHEN METABOLIC PANEL: CPT | Performed by: SURGERY

## 2020-06-07 PROCEDURE — C9803 HOPD COVID-19 SPEC COLLECT: HCPCS

## 2020-06-07 RX ORDER — LOSARTAN POTASSIUM 100 MG/1
100 TABLET ORAL DAILY
COMMUNITY

## 2020-06-07 NOTE — PAT
Per Anesthesia Request, patient instructed not to take their ACE/ARB medications on the AM of surgery.    Initiated Prevena Incision Management Teaching during PAT visit and Prevena Handout distributed to patient.    Patient to apply Chlorhexadine wipes  to surgical area (as instructed) the night before procedure and the AM of procedure. Wipes provided.    Patient instructed to drink 20 ounces (or until full) of Gatorade and it needs to be completed 1 hour before given arrival time for procedure (NO RED Gatorade)    Patient verbalized understanding.    PATIENT STATES DR ULLOA INSTRUCTED HER TO STOP ASA PRIOR TO SURGERY. PATIENT HAS A HISTORY OF A HEART STENT AND BLOOD CLOTS IN LUNGS, HEART, AND LEGS. PATIENT'S STENT WAS PLACED IN 2005 AND PATIENT HAS NOT SEEN A CARDIOLOGIST FOR APPROXIMATELY 7 YEARS. PER PATIENT SHE WAS INSTRUCTED TO TAKE 325MG ASA AND WAS CLEARED TO STOP ALL OTHER BLOOD THINNERS. LAST DOSE OF ASA WAS 6/2/2020 (6 DAYS AGO) PER PATIENT. NO NOTES IN COMPUTER FROM DR FOX OR DR ULLOA REGARDING STOPPING ASA.     COVID TEST PERFORMED TODAY.

## 2020-06-08 ENCOUNTER — ANESTHESIA EVENT (OUTPATIENT)
Dept: PERIOP | Facility: HOSPITAL | Age: 70
End: 2020-06-08

## 2020-06-08 LAB
REF LAB TEST METHOD: NORMAL
SARS-COV-2 RNA RESP QL NAA+PROBE: NOT DETECTED

## 2020-06-08 NOTE — PAT
Vivienne in Dr. Beckett office notified that patient has history of stent and blood clots and stopped her ASA (per Dr. Newton) 1 week ago. No new orders.

## 2020-06-09 ENCOUNTER — HOSPITAL ENCOUNTER (OUTPATIENT)
Dept: NUCLEAR MEDICINE | Facility: HOSPITAL | Age: 70
Discharge: HOME | End: 2020-06-09

## 2020-06-09 ENCOUNTER — HOSPITAL ENCOUNTER (OUTPATIENT)
Facility: HOSPITAL | Age: 70
Discharge: HOME | End: 2020-06-10
Attending: SURGERY | Admitting: SURGERY

## 2020-06-09 ENCOUNTER — ANESTHESIA (OUTPATIENT)
Dept: PERIOP | Facility: HOSPITAL | Age: 70
End: 2020-06-09

## 2020-06-09 DIAGNOSIS — C50.911 BREAST CANCER, RIGHT BREAST (HCC): ICD-10-CM

## 2020-06-09 DIAGNOSIS — D05.11 INTRADUCTAL CARCINOMA IN SITU OF RIGHT BREAST: ICD-10-CM

## 2020-06-09 LAB
GLUCOSE BLDC GLUCOMTR-MCNC: 172 MG/DL (ref 70–130)
HBA1C MFR BLD: 8.2 % (ref 4.8–5.6)

## 2020-06-09 PROCEDURE — 25010000002 BUPRENORPHINE PER 0.1 MG: Performed by: ANESTHESIOLOGY

## 2020-06-09 PROCEDURE — A9270 NON-COVERED ITEM OR SERVICE: HCPCS | Performed by: PLASTIC SURGERY

## 2020-06-09 PROCEDURE — 88331 PATH CONSLTJ SURG 1 BLK 1SPC: CPT | Performed by: PATHOLOGY

## 2020-06-09 PROCEDURE — 25010000002 HEPARIN (PORCINE) PER 1000 UNITS: Performed by: SURGERY

## 2020-06-09 PROCEDURE — 25010000002 DEXAMETHASONE SODIUM PHOSPHATE 10 MG/ML SOLUTION: Performed by: ANESTHESIOLOGY

## 2020-06-09 PROCEDURE — 88342 IMHCHEM/IMCYTCHM 1ST ANTB: CPT | Performed by: SURGERY

## 2020-06-09 PROCEDURE — 25010000002 GENTAMICIN PER 80 MG: Performed by: PLASTIC SURGERY

## 2020-06-09 PROCEDURE — 63710000001 PANTOPRAZOLE 40 MG TABLET DELAYED-RELEASE: Performed by: PLASTIC SURGERY

## 2020-06-09 PROCEDURE — 88307 TISSUE EXAM BY PATHOLOGIST: CPT | Performed by: SURGERY

## 2020-06-09 PROCEDURE — 25010000002 FENTANYL CITRATE (PF) 100 MCG/2ML SOLUTION: Performed by: NURSE ANESTHETIST, CERTIFIED REGISTERED

## 2020-06-09 PROCEDURE — 0 TECHNETIUM FILTERED SULFUR COLLOID: Performed by: SURGERY

## 2020-06-09 PROCEDURE — 25010000003 CEFAZOLIN PER 500 MG: Performed by: PLASTIC SURGERY

## 2020-06-09 PROCEDURE — 25010000002 SUCCINYLCHOLINE PER 20 MG: Performed by: NURSE ANESTHETIST, CERTIFIED REGISTERED

## 2020-06-09 PROCEDURE — 83036 HEMOGLOBIN GLYCOSYLATED A1C: CPT | Performed by: NURSE PRACTITIONER

## 2020-06-09 PROCEDURE — 25010000003 CEFAZOLIN IN DEXTROSE 2-4 GM/100ML-% SOLUTION: Performed by: PLASTIC SURGERY

## 2020-06-09 PROCEDURE — 25010000002 PROPOFOL 10 MG/ML EMULSION: Performed by: NURSE ANESTHETIST, CERTIFIED REGISTERED

## 2020-06-09 PROCEDURE — A9541 TC99M SULFUR COLLOID: HCPCS | Performed by: SURGERY

## 2020-06-09 PROCEDURE — 25010000002 DEXAMETHASONE PER 1 MG: Performed by: NURSE ANESTHETIST, CERTIFIED REGISTERED

## 2020-06-09 PROCEDURE — 38792 RA TRACER ID OF SENTINL NODE: CPT

## 2020-06-09 PROCEDURE — 63710000001 POTASSIUM CHLORIDE 20 MEQ PACK: Performed by: PLASTIC SURGERY

## 2020-06-09 PROCEDURE — C1789 PROSTHESIS, BREAST, IMP: HCPCS | Performed by: SURGERY

## 2020-06-09 PROCEDURE — 63710000001 ACETAMINOPHEN 500 MG TABLET: Performed by: PLASTIC SURGERY

## 2020-06-09 PROCEDURE — 19303 MAST SIMPLE COMPLETE: CPT

## 2020-06-09 PROCEDURE — 82962 GLUCOSE BLOOD TEST: CPT

## 2020-06-09 DEVICE — DEV CONTRL TISS STRATAFIX SPIRAL MNCRYL UD 3/0 PLS 30CM: Type: IMPLANTABLE DEVICE | Site: BREAST | Status: FUNCTIONAL

## 2020-06-09 DEVICE — EXPNDR TISS BRST F/HT V/P STL 133S FV/T 600CC: Type: IMPLANTABLE DEVICE | Site: BREAST | Status: FUNCTIONAL

## 2020-06-09 DEVICE — LIGACLIP MCA MULTIPLE CLIP APPLIERS, 20 MEDIUM CLIPS
Type: IMPLANTABLE DEVICE | Site: BREAST | Status: FUNCTIONAL
Brand: LIGACLIP

## 2020-06-09 RX ORDER — MORPHINE SULFATE 2 MG/ML
2 INJECTION, SOLUTION INTRAMUSCULAR; INTRAVENOUS
Status: DISCONTINUED | OUTPATIENT
Start: 2020-06-09 | End: 2020-06-10 | Stop reason: HOSPADM

## 2020-06-09 RX ORDER — FAMOTIDINE 20 MG/1
20 TABLET, FILM COATED ORAL
Status: DISCONTINUED | OUTPATIENT
Start: 2020-06-09 | End: 2020-06-09 | Stop reason: HOSPADM

## 2020-06-09 RX ORDER — OXYCODONE HYDROCHLORIDE 5 MG/1
5 TABLET ORAL EVERY 4 HOURS PRN
Status: DISCONTINUED | OUTPATIENT
Start: 2020-06-09 | End: 2020-06-10 | Stop reason: HOSPADM

## 2020-06-09 RX ORDER — PROMETHAZINE HYDROCHLORIDE 25 MG/ML
6.25 INJECTION, SOLUTION INTRAMUSCULAR; INTRAVENOUS ONCE AS NEEDED
Status: DISCONTINUED | OUTPATIENT
Start: 2020-06-09 | End: 2020-06-09 | Stop reason: HOSPADM

## 2020-06-09 RX ORDER — SODIUM CHLORIDE 0.9 % (FLUSH) 0.9 %
10 SYRINGE (ML) INJECTION EVERY 12 HOURS SCHEDULED
Status: DISCONTINUED | OUTPATIENT
Start: 2020-06-09 | End: 2020-06-09 | Stop reason: HOSPADM

## 2020-06-09 RX ORDER — SODIUM CHLORIDE 9 MG/ML
50 INJECTION, SOLUTION INTRAVENOUS CONTINUOUS
Status: DISCONTINUED | OUTPATIENT
Start: 2020-06-09 | End: 2020-06-10 | Stop reason: HOSPADM

## 2020-06-09 RX ORDER — ACETAMINOPHEN 325 MG/1
650 TABLET ORAL ONCE AS NEEDED
Status: DISCONTINUED | OUTPATIENT
Start: 2020-06-09 | End: 2020-06-09 | Stop reason: HOSPADM

## 2020-06-09 RX ORDER — HEPARIN SODIUM 5000 [USP'U]/ML
INJECTION, SOLUTION INTRAVENOUS; SUBCUTANEOUS AS NEEDED
Status: DISCONTINUED | OUTPATIENT
Start: 2020-06-09 | End: 2020-06-09 | Stop reason: HOSPADM

## 2020-06-09 RX ORDER — ONDANSETRON 2 MG/ML
4 INJECTION INTRAMUSCULAR; INTRAVENOUS ONCE AS NEEDED
Status: DISCONTINUED | OUTPATIENT
Start: 2020-06-09 | End: 2020-06-09 | Stop reason: HOSPADM

## 2020-06-09 RX ORDER — OXYCODONE AND ACETAMINOPHEN 7.5; 325 MG/1; MG/1
2 TABLET ORAL EVERY 4 HOURS PRN
Status: DISCONTINUED | OUTPATIENT
Start: 2020-06-09 | End: 2020-06-09 | Stop reason: HOSPADM

## 2020-06-09 RX ORDER — HEPARIN SODIUM 5000 [USP'U]/ML
5000 INJECTION, SOLUTION INTRAVENOUS; SUBCUTANEOUS ONCE
Status: DISCONTINUED | OUTPATIENT
Start: 2020-06-09 | End: 2020-06-09 | Stop reason: HOSPADM

## 2020-06-09 RX ORDER — DEXAMETHASONE SODIUM PHOSPHATE 4 MG/ML
INJECTION, SOLUTION INTRA-ARTICULAR; INTRALESIONAL; INTRAMUSCULAR; INTRAVENOUS; SOFT TISSUE AS NEEDED
Status: DISCONTINUED | OUTPATIENT
Start: 2020-06-09 | End: 2020-06-09 | Stop reason: SURG

## 2020-06-09 RX ORDER — LIDOCAINE HYDROCHLORIDE 10 MG/ML
0.5 INJECTION, SOLUTION EPIDURAL; INFILTRATION; INTRACAUDAL; PERINEURAL ONCE AS NEEDED
Status: COMPLETED | OUTPATIENT
Start: 2020-06-09 | End: 2020-06-09

## 2020-06-09 RX ORDER — SUCCINYLCHOLINE CHLORIDE 20 MG/ML
INJECTION INTRAMUSCULAR; INTRAVENOUS AS NEEDED
Status: DISCONTINUED | OUTPATIENT
Start: 2020-06-09 | End: 2020-06-09 | Stop reason: SURG

## 2020-06-09 RX ORDER — ACETAMINOPHEN 650 MG/1
650 SUPPOSITORY RECTAL ONCE AS NEEDED
Status: DISCONTINUED | OUTPATIENT
Start: 2020-06-09 | End: 2020-06-09 | Stop reason: HOSPADM

## 2020-06-09 RX ORDER — LIDOCAINE HYDROCHLORIDE 10 MG/ML
INJECTION, SOLUTION EPIDURAL; INFILTRATION; INTRACAUDAL; PERINEURAL AS NEEDED
Status: DISCONTINUED | OUTPATIENT
Start: 2020-06-09 | End: 2020-06-09 | Stop reason: SURG

## 2020-06-09 RX ORDER — PROPOFOL 10 MG/ML
VIAL (ML) INTRAVENOUS AS NEEDED
Status: DISCONTINUED | OUTPATIENT
Start: 2020-06-09 | End: 2020-06-09 | Stop reason: SURG

## 2020-06-09 RX ORDER — FENTANYL CITRATE 50 UG/ML
INJECTION, SOLUTION INTRAMUSCULAR; INTRAVENOUS AS NEEDED
Status: DISCONTINUED | OUTPATIENT
Start: 2020-06-09 | End: 2020-06-09 | Stop reason: SURG

## 2020-06-09 RX ORDER — ROCURONIUM BROMIDE 10 MG/ML
INJECTION, SOLUTION INTRAVENOUS AS NEEDED
Status: DISCONTINUED | OUTPATIENT
Start: 2020-06-09 | End: 2020-06-09 | Stop reason: SURG

## 2020-06-09 RX ORDER — FENTANYL CITRATE 50 UG/ML
50 INJECTION, SOLUTION INTRAMUSCULAR; INTRAVENOUS
Status: DISCONTINUED | OUTPATIENT
Start: 2020-06-09 | End: 2020-06-09 | Stop reason: HOSPADM

## 2020-06-09 RX ORDER — PROMETHAZINE HYDROCHLORIDE 25 MG/ML
6.25 INJECTION, SOLUTION INTRAMUSCULAR; INTRAVENOUS EVERY 6 HOURS PRN
Status: DISCONTINUED | OUTPATIENT
Start: 2020-06-09 | End: 2020-06-10 | Stop reason: HOSPADM

## 2020-06-09 RX ORDER — MAGNESIUM HYDROXIDE 1200 MG/15ML
LIQUID ORAL AS NEEDED
Status: DISCONTINUED | OUTPATIENT
Start: 2020-06-09 | End: 2020-06-09 | Stop reason: HOSPADM

## 2020-06-09 RX ORDER — NALOXONE HCL 0.4 MG/ML
0.4 VIAL (ML) INJECTION
Status: DISCONTINUED | OUTPATIENT
Start: 2020-06-09 | End: 2020-06-10 | Stop reason: HOSPADM

## 2020-06-09 RX ORDER — PROMETHAZINE HYDROCHLORIDE 25 MG/1
25 SUPPOSITORY RECTAL ONCE AS NEEDED
Status: DISCONTINUED | OUTPATIENT
Start: 2020-06-09 | End: 2020-06-09 | Stop reason: HOSPADM

## 2020-06-09 RX ORDER — POTASSIUM CHLORIDE 1.5 G/1.77G
20 POWDER, FOR SOLUTION ORAL DAILY
Status: DISCONTINUED | OUTPATIENT
Start: 2020-06-09 | End: 2020-06-10 | Stop reason: HOSPADM

## 2020-06-09 RX ORDER — IPRATROPIUM BROMIDE AND ALBUTEROL SULFATE 2.5; .5 MG/3ML; MG/3ML
3 SOLUTION RESPIRATORY (INHALATION) ONCE AS NEEDED
Status: DISCONTINUED | OUTPATIENT
Start: 2020-06-09 | End: 2020-06-09 | Stop reason: HOSPADM

## 2020-06-09 RX ORDER — ESMOLOL HYDROCHLORIDE 10 MG/ML
INJECTION INTRAVENOUS AS NEEDED
Status: DISCONTINUED | OUTPATIENT
Start: 2020-06-09 | End: 2020-06-09 | Stop reason: SURG

## 2020-06-09 RX ORDER — SODIUM CHLORIDE 9 MG/ML
INJECTION, SOLUTION INTRAVENOUS AS NEEDED
Status: DISCONTINUED | OUTPATIENT
Start: 2020-06-09 | End: 2020-06-09 | Stop reason: HOSPADM

## 2020-06-09 RX ORDER — BUPIVACAINE HYDROCHLORIDE 2.5 MG/ML
INJECTION, SOLUTION EPIDURAL; INFILTRATION; INTRACAUDAL
Status: COMPLETED | OUTPATIENT
Start: 2020-06-09 | End: 2020-06-09

## 2020-06-09 RX ORDER — SODIUM CHLORIDE 0.9 % (FLUSH) 0.9 %
10 SYRINGE (ML) INJECTION AS NEEDED
Status: DISCONTINUED | OUTPATIENT
Start: 2020-06-09 | End: 2020-06-09 | Stop reason: HOSPADM

## 2020-06-09 RX ORDER — ACETAMINOPHEN 500 MG
1000 TABLET ORAL EVERY 6 HOURS
Status: DISCONTINUED | OUTPATIENT
Start: 2020-06-09 | End: 2020-06-10 | Stop reason: HOSPADM

## 2020-06-09 RX ORDER — MEPERIDINE HYDROCHLORIDE 25 MG/ML
12.5 INJECTION INTRAMUSCULAR; INTRAVENOUS; SUBCUTANEOUS
Status: DISCONTINUED | OUTPATIENT
Start: 2020-06-09 | End: 2020-06-09 | Stop reason: HOSPADM

## 2020-06-09 RX ORDER — PANTOPRAZOLE SODIUM 40 MG/1
40 TABLET, DELAYED RELEASE ORAL DAILY
Status: DISCONTINUED | OUTPATIENT
Start: 2020-06-09 | End: 2020-06-10 | Stop reason: HOSPADM

## 2020-06-09 RX ORDER — BUPRENORPHINE HYDROCHLORIDE 0.32 MG/ML
INJECTION INTRAMUSCULAR; INTRAVENOUS
Status: COMPLETED | OUTPATIENT
Start: 2020-06-09 | End: 2020-06-09

## 2020-06-09 RX ORDER — CEFAZOLIN SODIUM 2 G/100ML
2 INJECTION, SOLUTION INTRAVENOUS ONCE
Status: COMPLETED | OUTPATIENT
Start: 2020-06-09 | End: 2020-06-09

## 2020-06-09 RX ORDER — LOSARTAN POTASSIUM 50 MG/1
100 TABLET ORAL DAILY
Status: DISCONTINUED | OUTPATIENT
Start: 2020-06-09 | End: 2020-06-10 | Stop reason: HOSPADM

## 2020-06-09 RX ORDER — ONDANSETRON 2 MG/ML
4 INJECTION INTRAMUSCULAR; INTRAVENOUS EVERY 6 HOURS PRN
Status: DISCONTINUED | OUTPATIENT
Start: 2020-06-09 | End: 2020-06-10 | Stop reason: HOSPADM

## 2020-06-09 RX ORDER — SODIUM CHLORIDE, SODIUM LACTATE, POTASSIUM CHLORIDE, CALCIUM CHLORIDE 600; 310; 30; 20 MG/100ML; MG/100ML; MG/100ML; MG/100ML
9 INJECTION, SOLUTION INTRAVENOUS CONTINUOUS PRN
Status: DISCONTINUED | OUTPATIENT
Start: 2020-06-09 | End: 2020-06-09 | Stop reason: HOSPADM

## 2020-06-09 RX ORDER — DIPHENHYDRAMINE HYDROCHLORIDE 50 MG/ML
12.5 INJECTION INTRAMUSCULAR; INTRAVENOUS EVERY 6 HOURS PRN
Status: DISCONTINUED | OUTPATIENT
Start: 2020-06-09 | End: 2020-06-10 | Stop reason: HOSPADM

## 2020-06-09 RX ORDER — PROMETHAZINE HYDROCHLORIDE 25 MG/1
25 TABLET ORAL ONCE AS NEEDED
Status: DISCONTINUED | OUTPATIENT
Start: 2020-06-09 | End: 2020-06-09 | Stop reason: HOSPADM

## 2020-06-09 RX ORDER — PREGABALIN 75 MG/1
75 CAPSULE ORAL ONCE
Status: COMPLETED | OUTPATIENT
Start: 2020-06-09 | End: 2020-06-09

## 2020-06-09 RX ORDER — DEXAMETHASONE SODIUM PHOSPHATE 10 MG/ML
INJECTION, SOLUTION INTRAMUSCULAR; INTRAVENOUS
Status: COMPLETED | OUTPATIENT
Start: 2020-06-09 | End: 2020-06-09

## 2020-06-09 RX ORDER — HYDROMORPHONE HYDROCHLORIDE 1 MG/ML
0.5 INJECTION, SOLUTION INTRAMUSCULAR; INTRAVENOUS; SUBCUTANEOUS
Status: DISCONTINUED | OUTPATIENT
Start: 2020-06-09 | End: 2020-06-09 | Stop reason: HOSPADM

## 2020-06-09 RX ORDER — CEFAZOLIN SODIUM 2 G/100ML
2 INJECTION, SOLUTION INTRAVENOUS EVERY 8 HOURS
Status: DISCONTINUED | OUTPATIENT
Start: 2020-06-09 | End: 2020-06-10 | Stop reason: HOSPADM

## 2020-06-09 RX ORDER — OXYCODONE HYDROCHLORIDE AND ACETAMINOPHEN 5; 325 MG/1; MG/1
1 TABLET ORAL ONCE AS NEEDED
Status: DISCONTINUED | OUTPATIENT
Start: 2020-06-09 | End: 2020-06-09 | Stop reason: HOSPADM

## 2020-06-09 RX ADMIN — BUPIVACAINE HYDROCHLORIDE 30 ML: 2.5 INJECTION, SOLUTION EPIDURAL; INFILTRATION; INTRACAUDAL; PERINEURAL at 12:36

## 2020-06-09 RX ADMIN — DEXAMETHASONE SODIUM PHOSPHATE 8 MG: 4 INJECTION, SOLUTION INTRAMUSCULAR; INTRAVENOUS at 12:11

## 2020-06-09 RX ADMIN — FAMOTIDINE 20 MG: 20 TABLET, FILM COATED ORAL at 10:22

## 2020-06-09 RX ADMIN — PREGABALIN 75 MG: 75 CAPSULE ORAL at 10:22

## 2020-06-09 RX ADMIN — CEFAZOLIN SODIUM 2 G: 2 INJECTION, SOLUTION INTRAVENOUS at 12:06

## 2020-06-09 RX ADMIN — SODIUM CHLORIDE, POTASSIUM CHLORIDE, SODIUM LACTATE AND CALCIUM CHLORIDE 9 ML/HR: 600; 310; 30; 20 INJECTION, SOLUTION INTRAVENOUS at 10:22

## 2020-06-09 RX ADMIN — POTASSIUM CHLORIDE 20 MEQ: 1.5 POWDER, FOR SOLUTION ORAL at 17:43

## 2020-06-09 RX ADMIN — ACETAMINOPHEN 1000 MG: 500 TABLET, FILM COATED ORAL at 23:46

## 2020-06-09 RX ADMIN — ROCURONIUM BROMIDE 5 MG: 10 INJECTION INTRAVENOUS at 12:11

## 2020-06-09 RX ADMIN — TECHNETIUM TC 99M SULFUR COLLOID 1 DOSE: KIT at 12:13

## 2020-06-09 RX ADMIN — SUCCINYLCHOLINE CHLORIDE 120 MG: 20 INJECTION, SOLUTION INTRAMUSCULAR; INTRAVENOUS; PARENTERAL at 12:11

## 2020-06-09 RX ADMIN — FENTANYL CITRATE 100 MCG: 50 INJECTION, SOLUTION INTRAMUSCULAR; INTRAVENOUS at 12:11

## 2020-06-09 RX ADMIN — BUPRENORPHINE HYDROCHLORIDE 0.3 MG: 0.32 INJECTION INTRAMUSCULAR; INTRAVENOUS at 12:36

## 2020-06-09 RX ADMIN — ESMOLOL HYDROCHLORIDE 50 MG: 10 INJECTION, SOLUTION INTRAVENOUS at 12:11

## 2020-06-09 RX ADMIN — CEFAZOLIN SODIUM 2 G: 2 INJECTION, SOLUTION INTRAVENOUS at 21:05

## 2020-06-09 RX ADMIN — PROPOFOL 200 MG: 10 INJECTION, EMULSION INTRAVENOUS at 12:11

## 2020-06-09 RX ADMIN — SODIUM CHLORIDE 50 ML/HR: 9 INJECTION, SOLUTION INTRAVENOUS at 17:43

## 2020-06-09 RX ADMIN — PANTOPRAZOLE SODIUM 40 MG: 40 TABLET, DELAYED RELEASE ORAL at 17:44

## 2020-06-09 RX ADMIN — DEXAMETHASONE SODIUM PHOSPHATE 2 MG: 10 INJECTION INTRAMUSCULAR; INTRAVENOUS at 12:36

## 2020-06-09 RX ADMIN — LIDOCAINE HYDROCHLORIDE 50 MG: 10 INJECTION, SOLUTION EPIDURAL; INFILTRATION; INTRACAUDAL; PERINEURAL at 12:11

## 2020-06-09 RX ADMIN — ACETAMINOPHEN 1000 MG: 500 TABLET, FILM COATED ORAL at 17:44

## 2020-06-09 RX ADMIN — LIDOCAINE HYDROCHLORIDE 0.5 ML: 10 INJECTION, SOLUTION EPIDURAL; INFILTRATION; INTRACAUDAL; PERINEURAL at 10:22

## 2020-06-09 NOTE — ANESTHESIA PROCEDURE NOTES
Airway  Urgency: elective    Date/Time: 6/9/2020 12:12 PM  Airway not difficult    General Information and Staff    Patient location during procedure: OR  CRNA: Brigid Sim CRNA    Indications and Patient Condition  Indications for airway management: airway protection    Preoxygenated: yes  MILS not maintained throughout  Mask difficulty assessment: 0 - not attempted    Final Airway Details  Final airway type: endotracheal airway      Successful airway: ETT  Cuffed: yes   Successful intubation technique: direct laryngoscopy  Facilitating devices/methods: intubating stylet  Endotracheal tube insertion site: oral  Blade: Jomar  Blade size: 3  ETT size (mm): 7.0  Cormack-Lehane Classification: grade I - full view of glottis  Placement verified by: chest auscultation and capnometry   Measured from: lips  ETT/EBT  to lips (cm): 22  Number of attempts at approach: 1  Assessment: lips, teeth, and gum same as pre-op and atraumatic intubation    Additional Comments  Negative epigastric sounds, Breath sound equal bilaterally with symmetric chest rise and fall

## 2020-06-09 NOTE — ANESTHESIA PROCEDURE NOTES
Peripheral Block      Patient reassessed immediately prior to procedure    Patient location during procedure: OR  Start time: 6/9/2020 12:12 PM  Stop time: 6/9/2020 12:15 PM  Reason for block: at surgeon's request and post-op pain management  Performed by  CRNA: Francisco Del Angel, LARS  Assisted by: Brigid Sim CRNA  Preanesthetic Checklist  Completed: patient identified, site marked, surgical consent, pre-op evaluation, timeout performed, IV checked, risks and benefits discussed and monitors and equipment checked  Prep:  Pt Position: supine  Sterile barriers:cap, gloves, gown and mask  Prep: ChloraPrep  Patient monitoring: blood pressure monitoring, continuous pulse oximetry and EKG  Procedure  Performed under: general  Guidance:ultrasound guided and landmark technique  Images:still images obtained, printed/placed on chart    Laterality:right  Block Type:PECS I and PECS II  Injection Technique:single-shot  Needle Type:short-bevel  Needle Gauge:20 G  Resistance on Injection: none    Medications Used: buprenorphine (BUPRENEX) injection, 0.3 mg  dexamethasone sodium phosphate injection, 2 mg  bupivacaine PF (MARCAINE) 0.25 % injection, 30 mL      Medications  Preservative Free Saline:10ml  Comment:         Post Assessment  Injection Assessment: negative aspiration for heme and incremental injection  Patient Tolerance:comfortable throughout block  Complications:no  Additional Notes  The pt. Was placed in the Supine Position and GA was induced     The insertion site was prepped with CHG and Ultrasound guidance with In-Plane techniquewas  a 4inch BBraun 360 degree echogenic needle was visualized.  Normal Saline PSF was  utilized for hydrodissection of tissue. PECS 1 Block- Pectoralis Major and Minor where identified and LA was injected between PMM and PmM at the level of the 3rd Rib(10ml),  PECS 2-  Pectoralis Minor and Serratus muscle where identified and the needle was advanced laterally in-plane with the 4th rib as  a backstop, pleura was monitored.  LA was injected between SA and PmM at the level of 4th rib( 20ml).  LA injection spread was visualized, injection was incremental 1-5ml, normal or low injection pressure, no intravascular injection, no pneumothorax appreciated.  Thank You.

## 2020-06-09 NOTE — ANESTHESIA POSTPROCEDURE EVALUATION
Patient: Bailey Howe    Procedure Summary     Date:  06/09/20 Room / Location:   [facility] /  [facility]    Anesthesia Start:  1204 Anesthesia Stop:  1437    Procedures:       RIGHT SIMPLE MASTECTOMY AND SENTINEL NODE BIOPSY (Right Breast)      RIGHT BREAST IMMEDIATE RECONSTRUCTION WITH TISSUE EXPNADER (Bilateral Breast) Diagnosis:       Acquired absence of breast and absent nipple, bilateral      Breast cancer, right (CMS/[facility])    Surgeon:  Barbara Butt MD; Ministerio Newton MD Provider:  Brooks Burkett MD    Anesthesia Type:  general with block ASA Status:  3          Anesthesia Type: general with block    Vitals  No vitals data found for the desired time range.          Post Anesthesia Care and Evaluation    Patient location during evaluation: PACU  Patient participation: complete - patient participated  Level of consciousness: awake and alert  Pain score: 0  Pain management: adequate  Airway patency: patent  Anesthetic complications: No anesthetic complications  PONV Status: none  Cardiovascular status: hemodynamically stable and acceptable  Respiratory status: nonlabored ventilation, acceptable and nasal cannula  Hydration status: acceptable

## 2020-06-10 VITALS
SYSTOLIC BLOOD PRESSURE: 136 MMHG | TEMPERATURE: 98 F | HEART RATE: 80 BPM | HEIGHT: 63 IN | DIASTOLIC BLOOD PRESSURE: 83 MMHG | BODY MASS INDEX: 32.93 KG/M2 | WEIGHT: 185.85 LBS | RESPIRATION RATE: 20 BRPM | OXYGEN SATURATION: 95 %

## 2020-06-10 PROCEDURE — A9270 NON-COVERED ITEM OR SERVICE: HCPCS | Performed by: PLASTIC SURGERY

## 2020-06-10 PROCEDURE — 25010000003 CEFAZOLIN IN DEXTROSE 2-4 GM/100ML-% SOLUTION: Performed by: PLASTIC SURGERY

## 2020-06-10 PROCEDURE — 63710000001 LOSARTAN 50 MG TABLET: Performed by: PLASTIC SURGERY

## 2020-06-10 PROCEDURE — 63710000001 PANTOPRAZOLE 40 MG TABLET DELAYED-RELEASE: Performed by: PLASTIC SURGERY

## 2020-06-10 PROCEDURE — 63710000001 ACETAMINOPHEN 500 MG TABLET: Performed by: PLASTIC SURGERY

## 2020-06-10 PROCEDURE — 63710000001 POTASSIUM CHLORIDE 20 MEQ PACK: Performed by: PLASTIC SURGERY

## 2020-06-10 RX ADMIN — LOSARTAN POTASSIUM 100 MG: 50 TABLET, FILM COATED ORAL at 08:13

## 2020-06-10 RX ADMIN — POTASSIUM CHLORIDE 20 MEQ: 1.5 POWDER, FOR SOLUTION ORAL at 08:13

## 2020-06-10 RX ADMIN — ACETAMINOPHEN 1000 MG: 500 TABLET, FILM COATED ORAL at 05:43

## 2020-06-10 RX ADMIN — CEFAZOLIN SODIUM 2 G: 2 INJECTION, SOLUTION INTRAVENOUS at 03:53

## 2020-06-10 RX ADMIN — PANTOPRAZOLE SODIUM 40 MG: 40 TABLET, DELAYED RELEASE ORAL at 05:43

## 2020-06-12 LAB
CYTO UR: NORMAL
LAB AP CASE REPORT: NORMAL
LAB AP CLINICAL INFORMATION: NORMAL
LAB AP DIAGNOSIS COMMENT: NORMAL
Lab: NORMAL
PATH REPORT.FINAL DX SPEC: NORMAL
PATH REPORT.GROSS SPEC: NORMAL

## 2020-06-30 ENCOUNTER — OFFICE VISIT (OUTPATIENT)
Dept: ONCOLOGY | Facility: CLINIC | Age: 70
End: 2020-06-30

## 2020-06-30 ENCOUNTER — LAB (OUTPATIENT)
Dept: LAB | Facility: HOSPITAL | Age: 70
End: 2020-06-30

## 2020-06-30 VITALS
HEART RATE: 74 BPM | OXYGEN SATURATION: 96 % | HEIGHT: 63 IN | BODY MASS INDEX: 31.18 KG/M2 | WEIGHT: 176 LBS | SYSTOLIC BLOOD PRESSURE: 138 MMHG | DIASTOLIC BLOOD PRESSURE: 91 MMHG | RESPIRATION RATE: 12 BRPM | TEMPERATURE: 97.8 F

## 2020-06-30 DIAGNOSIS — D50.8 IRON DEFICIENCY ANEMIA SECONDARY TO INADEQUATE DIETARY IRON INTAKE: Primary | ICD-10-CM

## 2020-06-30 DIAGNOSIS — C50.011 MALIGNANT NEOPLASM INVOLVING BOTH NIPPLE AND AREOLA OF RIGHT BREAST IN FEMALE, UNSPECIFIED ESTROGEN RECEPTOR STATUS (HCC): ICD-10-CM

## 2020-06-30 DIAGNOSIS — K90.9 IRON MALABSORPTION: ICD-10-CM

## 2020-06-30 LAB
ALBUMIN SERPL-MCNC: 3.9 G/DL (ref 3.5–5.2)
ALBUMIN/GLOB SERPL: 1.3 G/DL
ALP SERPL-CCNC: 98 U/L (ref 39–117)
ALT SERPL W P-5'-P-CCNC: 89 U/L (ref 1–33)
ANION GAP SERPL CALCULATED.3IONS-SCNC: 11.7 MMOL/L (ref 5–15)
AST SERPL-CCNC: 109 U/L (ref 1–32)
BASOPHILS # BLD AUTO: 0.06 10*3/MM3 (ref 0–0.2)
BASOPHILS NFR BLD AUTO: 1.1 % (ref 0–1.5)
BILIRUB SERPL-MCNC: 0.5 MG/DL (ref 0.2–1.2)
BUN SERPL-MCNC: 14 MG/DL (ref 8–23)
BUN/CREAT SERPL: 16.1 (ref 7–25)
CALCIUM SPEC-SCNC: 9.8 MG/DL (ref 8.6–10.5)
CHLORIDE SERPL-SCNC: 101 MMOL/L (ref 98–107)
CO2 SERPL-SCNC: 24.3 MMOL/L (ref 22–29)
CREAT SERPL-MCNC: 0.87 MG/DL (ref 0.57–1)
DEPRECATED RDW RBC AUTO: 41 FL (ref 37–54)
EOSINOPHIL # BLD AUTO: 0.41 10*3/MM3 (ref 0–0.4)
EOSINOPHIL NFR BLD AUTO: 7.6 % (ref 0.3–6.2)
ERYTHROCYTE [DISTWIDTH] IN BLOOD BY AUTOMATED COUNT: 12.3 % (ref 12.3–15.4)
FERRITIN SERPL-MCNC: 180.5 NG/ML (ref 13–150)
GFR SERPL CREATININE-BSD FRML MDRD: 64 ML/MIN/1.73
GLOBULIN UR ELPH-MCNC: 3.1 GM/DL
GLUCOSE SERPL-MCNC: 149 MG/DL (ref 65–99)
HCT VFR BLD AUTO: 42.1 % (ref 34–46.6)
HGB BLD-MCNC: 14.5 G/DL (ref 12–15.9)
IMM GRANULOCYTES # BLD AUTO: 0.02 10*3/MM3 (ref 0–0.05)
IMM GRANULOCYTES NFR BLD AUTO: 0.4 % (ref 0–0.5)
IRON 24H UR-MRATE: 108 MCG/DL (ref 37–145)
IRON SATN MFR SERPL: 36 % (ref 20–50)
LYMPHOCYTES # BLD AUTO: 1.78 10*3/MM3 (ref 0.7–3.1)
LYMPHOCYTES NFR BLD AUTO: 32.9 % (ref 19.6–45.3)
MCH RBC QN AUTO: 31.6 PG (ref 26.6–33)
MCHC RBC AUTO-ENTMCNC: 34.4 G/DL (ref 31.5–35.7)
MCV RBC AUTO: 91.7 FL (ref 79–97)
MONOCYTES # BLD AUTO: 0.52 10*3/MM3 (ref 0.1–0.9)
MONOCYTES NFR BLD AUTO: 9.6 % (ref 5–12)
NEUTROPHILS NFR BLD AUTO: 2.62 10*3/MM3 (ref 1.7–7)
NEUTROPHILS NFR BLD AUTO: 48.4 % (ref 42.7–76)
NRBC BLD AUTO-RTO: 0 /100 WBC (ref 0–0.2)
PLATELET # BLD AUTO: 285 10*3/MM3 (ref 140–450)
PMV BLD AUTO: 10.8 FL (ref 6–12)
POTASSIUM SERPL-SCNC: 3.7 MMOL/L (ref 3.5–5.2)
PROT SERPL-MCNC: 7 G/DL (ref 6–8.5)
RBC # BLD AUTO: 4.59 10*6/MM3 (ref 3.77–5.28)
SODIUM SERPL-SCNC: 137 MMOL/L (ref 136–145)
TIBC SERPL-MCNC: 297 MCG/DL (ref 298–536)
TRANSFERRIN SERPL-MCNC: 199 MG/DL (ref 200–360)
WBC # BLD AUTO: 5.41 10*3/MM3 (ref 3.4–10.8)

## 2020-06-30 PROCEDURE — 85025 COMPLETE CBC W/AUTO DIFF WBC: CPT | Performed by: NURSE PRACTITIONER

## 2020-06-30 PROCEDURE — 99214 OFFICE O/P EST MOD 30 MIN: CPT | Performed by: NURSE PRACTITIONER

## 2020-06-30 PROCEDURE — 82728 ASSAY OF FERRITIN: CPT | Performed by: NURSE PRACTITIONER

## 2020-06-30 PROCEDURE — 84466 ASSAY OF TRANSFERRIN: CPT | Performed by: NURSE PRACTITIONER

## 2020-06-30 PROCEDURE — 36415 COLL VENOUS BLD VENIPUNCTURE: CPT | Performed by: NURSE PRACTITIONER

## 2020-06-30 PROCEDURE — 83540 ASSAY OF IRON: CPT | Performed by: NURSE PRACTITIONER

## 2020-06-30 PROCEDURE — 80053 COMPREHEN METABOLIC PANEL: CPT | Performed by: NURSE PRACTITIONER

## 2020-06-30 NOTE — PROGRESS NOTES
"      PROBLEM LIST:  1.  Recurring iron deficiency anemia with intolerance of oral iron.   2. Right breast cancer  A. Right mastectomy completed 06/09/2020 with right immediate implant-based breast reconstruction with placement of a tissue expander in her old augmentation mammoplasty partial submuscular breast pocket  B. Pathology showed DCIS.   3.  History of Left breast cancer.   A. Diagnosed 1991. Mastecotmy with 28 ymph nodes removed.  She says 3 or 4 lymph nodes were involved.  Received adjuvant chemotherapy.   3.  History of melanoma.   4.  Coronary artery disease with prior stents.   5.  History of pulmonary embolus.   6.  Hypertension.     Subjective     CHIEF COMPLAINT: concern about right breast    HISTORY OF PRESENT ILLNESS:   Bailey Howe returns for follow-up.   She had a recent right mastectomy with expander placed. Pathology showed DCIS. She has scheduled appointments with Dr. Butt and Dr. Newton as scheduled.     Overall, she says she feels good. She is recovering well. She has not had a iron replacement in awhile. She has not had iron studies in awhile. No shortness of breath, fevers, or headaches.       Past Medical History, Past Surgical History, Social History, Family History have been reviewed and are without significant changes except as mentioned.    Review of Systems   A comprehensive 14 point review of systems was performed and was negative except as mentioned.    Medications:  The current medication list was reviewed in the EMR    ALLERGIES:  No Known Allergies    Objective      /91   Pulse 74   Temp 97.8 °F (36.6 °C) (Temporal)   Resp 12   Ht 160 cm (63\")   Wt 79.8 kg (176 lb)   SpO2 96%   BMI 31.18 kg/m²      Performance Status: 0    General: well appearing female in no acute distress  Neuro: alert and oriented  HEENT: sclera anicteric, oropharynx clear  Lymphatics: no cervical, supraclavicular, or axillary adenopathy  Cardiovascular: regular rate and rhythm, no " murmurs  Breast: Status post left mastectomy with implant reconstruction.  Right breast has an implant in place.  Some vague nodularity at 9:00 in a linear pattern.  No firm mass.  Lungs: clear to auscultation bilaterally  Abdomen: soft, nontender, nondistended.  No palpable organomegaly  Extremeties: no lower extremity edema  Skin: no rashes, lesions, bruising, or petechiae  Psych: mood and affect appropriate          Assessment/Plan   Bailey Howe is a 69 y.o. year old female with iron deficiency anemia. We will check labs today and repeat IV iron if indicated. We will repeat IV iron if indicated. I will call her with results.     DCIS right breast. She had a right mastectomy. No further treatment is needed from an oncology stand point. She will continue to follow up with Dr. Butt and Dr. Newton for surgical management.     Follow-up in 6 months.                  I spent a total of  25 minutes in direct patient care, greater than  20   minutes (greater than 50%) were spent in coordination of care, and counseling the patient regarding DCIS and iron deficiency anemia.  I answered any questions patient had with medication and plan.         Sri Carvajal APRN  [facility]ology [facility] On[facility]    6/30/2020          CC:

## 2021-01-19 ENCOUNTER — APPOINTMENT (OUTPATIENT)
Dept: PREADMISSION TESTING | Facility: HOSPITAL | Age: 71
End: 2021-01-19

## 2021-01-19 VITALS — TEMPERATURE: 97.5 F | WEIGHT: 167.33 LBS | BODY MASS INDEX: 30.79 KG/M2 | HEIGHT: 62 IN

## 2021-01-19 LAB
ANION GAP SERPL CALCULATED.3IONS-SCNC: 12 MMOL/L (ref 5–15)
BUN SERPL-MCNC: 17 MG/DL (ref 8–23)
BUN/CREAT SERPL: 21 (ref 7–25)
CALCIUM SPEC-SCNC: 10.7 MG/DL (ref 8.6–10.5)
CHLORIDE SERPL-SCNC: 100 MMOL/L (ref 98–107)
CO2 SERPL-SCNC: 29 MMOL/L (ref 22–29)
CREAT SERPL-MCNC: 0.81 MG/DL (ref 0.57–1)
DEPRECATED RDW RBC AUTO: 44 FL (ref 37–54)
ERYTHROCYTE [DISTWIDTH] IN BLOOD BY AUTOMATED COUNT: 12.8 % (ref 12.3–15.4)
GFR SERPL CREATININE-BSD FRML MDRD: 70 ML/MIN/1.73
GLUCOSE SERPL-MCNC: 109 MG/DL (ref 65–99)
HCT VFR BLD AUTO: 47.7 % (ref 34–46.6)
HGB BLD-MCNC: 15.5 G/DL (ref 12–15.9)
MCH RBC QN AUTO: 30.6 PG (ref 26.6–33)
MCHC RBC AUTO-ENTMCNC: 32.5 G/DL (ref 31.5–35.7)
MCV RBC AUTO: 94.3 FL (ref 79–97)
PLATELET # BLD AUTO: 240 10*3/MM3 (ref 140–450)
PMV BLD AUTO: 10.3 FL (ref 6–12)
POTASSIUM SERPL-SCNC: 3.7 MMOL/L (ref 3.5–5.2)
QT INTERVAL: 402 MS
QTC INTERVAL: 446 MS
RBC # BLD AUTO: 5.06 10*6/MM3 (ref 3.77–5.28)
SODIUM SERPL-SCNC: 141 MMOL/L (ref 136–145)
WBC # BLD AUTO: 6.35 10*3/MM3 (ref 3.4–10.8)

## 2021-01-19 PROCEDURE — U0004 COV-19 TEST NON-CDC HGH THRU: HCPCS

## 2021-01-19 PROCEDURE — C9803 HOPD COVID-19 SPEC COLLECT: HCPCS

## 2021-01-19 PROCEDURE — 85027 COMPLETE CBC AUTOMATED: CPT

## 2021-01-19 PROCEDURE — 80048 BASIC METABOLIC PNL TOTAL CA: CPT

## 2021-01-19 PROCEDURE — 93010 ELECTROCARDIOGRAM REPORT: CPT | Performed by: INTERNAL MEDICINE

## 2021-01-19 PROCEDURE — 36415 COLL VENOUS BLD VENIPUNCTURE: CPT

## 2021-01-19 PROCEDURE — 93005 ELECTROCARDIOGRAM TRACING: CPT

## 2021-01-19 NOTE — PAT
Per Anesthesia Request, patient instructed not to take their ACE/ARB medications on the AM of surgery.    An arrival time for procedure was not given during PAT visit. If patient had any questions or concerns about their arrival time, they were instructed to contact their surgeon/physician.  Additionally, if the patient referred to an arrival time that was acquired from their my chart account, patient was encouraged to verify that time with their surgeon/physician.  NO arrival times given in Pre Admission Testing Department.    Patient to apply Chlorhexadine wipes  to surgical area (as instructed) the night before procedure and the AM of procedure. Wipes provided.    Patient instructed to drink 20 ounces (or until full) of Gatorade and it needs to be completed 1 hour before given arrival time for procedure (NO RED Gatorade)    Patient verbalized understanding.    COVID TEST PERFORMED TODAY.

## 2021-01-20 ENCOUNTER — ANESTHESIA EVENT (OUTPATIENT)
Dept: PERIOP | Facility: HOSPITAL | Age: 71
End: 2021-01-20

## 2021-01-20 LAB — SARS-COV-2 RNA RESP QL NAA+PROBE: NOT DETECTED

## 2021-01-20 RX ORDER — FAMOTIDINE 10 MG/ML
20 INJECTION, SOLUTION INTRAVENOUS ONCE
Status: CANCELLED | OUTPATIENT
Start: 2021-01-20 | End: 2021-01-20

## 2021-01-21 ENCOUNTER — HOSPITAL ENCOUNTER (OUTPATIENT)
Facility: HOSPITAL | Age: 71
Setting detail: SURGERY ADMIT
Discharge: HOME | End: 2021-01-21
Attending: PLASTIC SURGERY | Admitting: PLASTIC SURGERY

## 2021-01-21 ENCOUNTER — ANESTHESIA (OUTPATIENT)
Dept: PERIOP | Facility: HOSPITAL | Age: 71
End: 2021-01-21

## 2021-01-21 VITALS
BODY MASS INDEX: 30.73 KG/M2 | HEIGHT: 62 IN | RESPIRATION RATE: 20 BRPM | TEMPERATURE: 97.4 F | HEART RATE: 61 BPM | OXYGEN SATURATION: 100 % | DIASTOLIC BLOOD PRESSURE: 91 MMHG | SYSTOLIC BLOOD PRESSURE: 141 MMHG | WEIGHT: 167 LBS

## 2021-01-21 DIAGNOSIS — C50.011 MALIGNANT NEOPLASM OF AREOLA OF RIGHT BREAST IN FEMALE, UNSPECIFIED ESTROGEN RECEPTOR STATUS (HCC): Primary | ICD-10-CM

## 2021-01-21 LAB
GLUCOSE BLDC GLUCOMTR-MCNC: 125 MG/DL (ref 70–130)
GLUCOSE BLDC GLUCOMTR-MCNC: 145 MG/DL (ref 70–130)

## 2021-01-21 PROCEDURE — 25010000002 GENTAMICIN PER 80 MG: Performed by: PLASTIC SURGERY

## 2021-01-21 PROCEDURE — C1789 PROSTHESIS, BREAST, IMP: HCPCS | Performed by: PLASTIC SURGERY

## 2021-01-21 PROCEDURE — 25010000002 PROPOFOL 10 MG/ML EMULSION: Performed by: NURSE ANESTHETIST, CERTIFIED REGISTERED

## 2021-01-21 PROCEDURE — 82962 GLUCOSE BLOOD TEST: CPT

## 2021-01-21 PROCEDURE — 25010000002 ONDANSETRON PER 1 MG: Performed by: NURSE ANESTHETIST, CERTIFIED REGISTERED

## 2021-01-21 PROCEDURE — 25010000003 CEFAZOLIN IN DEXTROSE 2-4 GM/100ML-% SOLUTION: Performed by: PLASTIC SURGERY

## 2021-01-21 PROCEDURE — 25010000003 CEFAZOLIN PER 500 MG: Performed by: PLASTIC SURGERY

## 2021-01-21 PROCEDURE — 25010000003 LIDOCAINE 1 % SOLUTION: Performed by: NURSE ANESTHETIST, CERTIFIED REGISTERED

## 2021-01-21 PROCEDURE — 25010000002 NEOSTIGMINE 10 MG/10ML SOLUTION: Performed by: NURSE ANESTHETIST, CERTIFIED REGISTERED

## 2021-01-21 PROCEDURE — 25010000002 FENTANYL CITRATE (PF) 100 MCG/2ML SOLUTION: Performed by: NURSE ANESTHETIST, CERTIFIED REGISTERED

## 2021-01-21 PROCEDURE — 25010000002 DEXAMETHASONE PER 1 MG: Performed by: NURSE ANESTHETIST, CERTIFIED REGISTERED

## 2021-01-21 DEVICE — DEV CONTRL TISS STRATAFIX SPIRAL MNCRYL UD 3/0 PLS 30CM: Type: IMPLANTABLE DEVICE | Site: BREAST | Status: FUNCTIONAL

## 2021-01-21 DEVICE — BRST GEL NATRELLE INSPIRA SMOTH COHESIVE XF/P 800CC: Type: IMPLANTABLE DEVICE | Site: BREAST | Status: FUNCTIONAL

## 2021-01-21 RX ORDER — SODIUM CHLORIDE 0.9 % (FLUSH) 0.9 %
10 SYRINGE (ML) INJECTION EVERY 12 HOURS SCHEDULED
Status: DISCONTINUED | OUTPATIENT
Start: 2021-01-21 | End: 2021-01-21 | Stop reason: HOSPADM

## 2021-01-21 RX ORDER — ROCURONIUM BROMIDE 10 MG/ML
INJECTION, SOLUTION INTRAVENOUS AS NEEDED
Status: DISCONTINUED | OUTPATIENT
Start: 2021-01-21 | End: 2021-01-21 | Stop reason: SURG

## 2021-01-21 RX ORDER — CEFAZOLIN SODIUM 2 G/100ML
2 INJECTION, SOLUTION INTRAVENOUS ONCE
Status: COMPLETED | OUTPATIENT
Start: 2021-01-21 | End: 2021-01-21

## 2021-01-21 RX ORDER — FAMOTIDINE 20 MG/1
20 TABLET, FILM COATED ORAL ONCE
Status: COMPLETED | OUTPATIENT
Start: 2021-01-21 | End: 2021-01-21

## 2021-01-21 RX ORDER — LIDOCAINE HYDROCHLORIDE AND EPINEPHRINE 10; 10 MG/ML; UG/ML
INJECTION, SOLUTION INFILTRATION; PERINEURAL AS NEEDED
Status: DISCONTINUED | OUTPATIENT
Start: 2021-01-21 | End: 2021-01-21 | Stop reason: HOSPADM

## 2021-01-21 RX ORDER — NEOSTIGMINE METHYLSULFATE 1 MG/ML
INJECTION, SOLUTION INTRAVENOUS AS NEEDED
Status: DISCONTINUED | OUTPATIENT
Start: 2021-01-21 | End: 2021-01-21 | Stop reason: SURG

## 2021-01-21 RX ORDER — PROPOFOL 10 MG/ML
VIAL (ML) INTRAVENOUS AS NEEDED
Status: DISCONTINUED | OUTPATIENT
Start: 2021-01-21 | End: 2021-01-21 | Stop reason: SURG

## 2021-01-21 RX ORDER — SODIUM CHLORIDE, SODIUM LACTATE, POTASSIUM CHLORIDE, CALCIUM CHLORIDE 600; 310; 30; 20 MG/100ML; MG/100ML; MG/100ML; MG/100ML
9 INJECTION, SOLUTION INTRAVENOUS CONTINUOUS
Status: DISCONTINUED | OUTPATIENT
Start: 2021-01-21 | End: 2021-01-21 | Stop reason: HOSPADM

## 2021-01-21 RX ORDER — ONDANSETRON 2 MG/ML
INJECTION INTRAMUSCULAR; INTRAVENOUS AS NEEDED
Status: DISCONTINUED | OUTPATIENT
Start: 2021-01-21 | End: 2021-01-21 | Stop reason: SURG

## 2021-01-21 RX ORDER — SODIUM CHLORIDE 0.9 % (FLUSH) 0.9 %
10 SYRINGE (ML) INJECTION AS NEEDED
Status: DISCONTINUED | OUTPATIENT
Start: 2021-01-21 | End: 2021-01-21 | Stop reason: HOSPADM

## 2021-01-21 RX ORDER — LIDOCAINE HYDROCHLORIDE 10 MG/ML
INJECTION, SOLUTION INFILTRATION; PERINEURAL AS NEEDED
Status: DISCONTINUED | OUTPATIENT
Start: 2021-01-21 | End: 2021-01-21 | Stop reason: SURG

## 2021-01-21 RX ORDER — DEXAMETHASONE SODIUM PHOSPHATE 4 MG/ML
INJECTION, SOLUTION INTRA-ARTICULAR; INTRALESIONAL; INTRAMUSCULAR; INTRAVENOUS; SOFT TISSUE AS NEEDED
Status: DISCONTINUED | OUTPATIENT
Start: 2021-01-21 | End: 2021-01-21 | Stop reason: SURG

## 2021-01-21 RX ORDER — GLYCOPYRROLATE 0.2 MG/ML
INJECTION INTRAMUSCULAR; INTRAVENOUS AS NEEDED
Status: DISCONTINUED | OUTPATIENT
Start: 2021-01-21 | End: 2021-01-21 | Stop reason: SURG

## 2021-01-21 RX ORDER — EPHEDRINE SULFATE 50 MG/ML
INJECTION, SOLUTION INTRAVENOUS AS NEEDED
Status: DISCONTINUED | OUTPATIENT
Start: 2021-01-21 | End: 2021-01-21 | Stop reason: SURG

## 2021-01-21 RX ORDER — FENTANYL CITRATE 50 UG/ML
50 INJECTION, SOLUTION INTRAMUSCULAR; INTRAVENOUS
Status: DISCONTINUED | OUTPATIENT
Start: 2021-01-21 | End: 2021-01-21 | Stop reason: HOSPADM

## 2021-01-21 RX ORDER — FENTANYL CITRATE 50 UG/ML
INJECTION, SOLUTION INTRAMUSCULAR; INTRAVENOUS AS NEEDED
Status: DISCONTINUED | OUTPATIENT
Start: 2021-01-21 | End: 2021-01-21 | Stop reason: SURG

## 2021-01-21 RX ORDER — OXYCODONE HYDROCHLORIDE 5 MG/1
5 TABLET ORAL EVERY 4 HOURS PRN
Qty: 25 TABLET | Refills: 0 | Status: SHIPPED | OUTPATIENT
Start: 2021-01-21

## 2021-01-21 RX ORDER — LIDOCAINE HYDROCHLORIDE 10 MG/ML
0.5 INJECTION, SOLUTION EPIDURAL; INFILTRATION; INTRACAUDAL; PERINEURAL ONCE AS NEEDED
Status: COMPLETED | OUTPATIENT
Start: 2021-01-21 | End: 2021-01-21

## 2021-01-21 RX ORDER — MAGNESIUM HYDROXIDE 1200 MG/15ML
LIQUID ORAL AS NEEDED
Status: DISCONTINUED | OUTPATIENT
Start: 2021-01-21 | End: 2021-01-21 | Stop reason: HOSPADM

## 2021-01-21 RX ORDER — ONDANSETRON 2 MG/ML
4 INJECTION INTRAMUSCULAR; INTRAVENOUS ONCE AS NEEDED
Status: DISCONTINUED | OUTPATIENT
Start: 2021-01-21 | End: 2021-01-21 | Stop reason: HOSPADM

## 2021-01-21 RX ORDER — HYDROCODONE BITARTRATE AND ACETAMINOPHEN 5; 325 MG/1; MG/1
1 TABLET ORAL ONCE AS NEEDED
Status: COMPLETED | OUTPATIENT
Start: 2021-01-21 | End: 2021-01-21

## 2021-01-21 RX ADMIN — NEOSTIGMINE 4 MG: 1 INJECTION INTRAVENOUS at 09:33

## 2021-01-21 RX ADMIN — FENTANYL CITRATE 50 MCG: 0.05 INJECTION, SOLUTION INTRAMUSCULAR; INTRAVENOUS at 10:25

## 2021-01-21 RX ADMIN — SODIUM CHLORIDE, POTASSIUM CHLORIDE, SODIUM LACTATE AND CALCIUM CHLORIDE: 600; 310; 30; 20 INJECTION, SOLUTION INTRAVENOUS at 09:43

## 2021-01-21 RX ADMIN — HYDROCODONE BITARTRATE AND ACETAMINOPHEN 1 TABLET: 5; 325 TABLET ORAL at 10:45

## 2021-01-21 RX ADMIN — ROCURONIUM BROMIDE 10 MG: 10 INJECTION INTRAVENOUS at 08:32

## 2021-01-21 RX ADMIN — EPHEDRINE SULFATE 10 MG: 50 INJECTION, SOLUTION INTRAVENOUS at 08:10

## 2021-01-21 RX ADMIN — FENTANYL CITRATE 50 MCG: 50 INJECTION, SOLUTION INTRAMUSCULAR; INTRAVENOUS at 08:32

## 2021-01-21 RX ADMIN — SODIUM CHLORIDE, POTASSIUM CHLORIDE, SODIUM LACTATE AND CALCIUM CHLORIDE 9 ML/HR: 600; 310; 30; 20 INJECTION, SOLUTION INTRAVENOUS at 06:30

## 2021-01-21 RX ADMIN — FENTANYL CITRATE 50 MCG: 0.05 INJECTION, SOLUTION INTRAMUSCULAR; INTRAVENOUS at 10:40

## 2021-01-21 RX ADMIN — ONDANSETRON 4 MG: 2 INJECTION INTRAMUSCULAR; INTRAVENOUS at 09:03

## 2021-01-21 RX ADMIN — PROPOFOL 25 MCG/KG/MIN: 10 INJECTION, EMULSION INTRAVENOUS at 07:36

## 2021-01-21 RX ADMIN — LIDOCAINE HYDROCHLORIDE 50 MG: 10 INJECTION, SOLUTION INFILTRATION; PERINEURAL at 07:33

## 2021-01-21 RX ADMIN — CEFAZOLIN SODIUM 2 G: 2 INJECTION, SOLUTION INTRAVENOUS at 07:29

## 2021-01-21 RX ADMIN — ROCURONIUM BROMIDE 40 MG: 10 INJECTION INTRAVENOUS at 07:33

## 2021-01-21 RX ADMIN — GLYCOPYRROLATE 0.6 MG: 0.4 INJECTION INTRAMUSCULAR; INTRAVENOUS at 09:33

## 2021-01-21 RX ADMIN — FAMOTIDINE 20 MG: 20 TABLET, FILM COATED ORAL at 06:29

## 2021-01-21 RX ADMIN — LIDOCAINE HYDROCHLORIDE 0.4 ML: 10 INJECTION, SOLUTION EPIDURAL; INFILTRATION; INTRACAUDAL; PERINEURAL at 06:29

## 2021-01-21 RX ADMIN — FENTANYL CITRATE 50 MCG: 50 INJECTION, SOLUTION INTRAMUSCULAR; INTRAVENOUS at 07:33

## 2021-01-21 RX ADMIN — DEXAMETHASONE SODIUM PHOSPHATE 8 MG: 4 INJECTION, SOLUTION INTRA-ARTICULAR; INTRALESIONAL; INTRAMUSCULAR; INTRAVENOUS; SOFT TISSUE at 07:41

## 2021-01-21 RX ADMIN — PROPOFOL 150 MG: 10 INJECTION, EMULSION INTRAVENOUS at 07:33

## 2021-01-21 NOTE — ANESTHESIA POSTPROCEDURE EVALUATION
Patient: Bailey Howe    Procedure Summary     Date: 01/21/21 Room / Location:  [facility]    Anesthesia Start: 0729 Anesthesia Stop: 1007    Procedure: RIGHT BREAST TISSUE EXPANDER TO PERMANENT IMPLANT AND LEFT BREAST IMPLANT EXCHANGE, RIGHT COMPLEX CLOSURE (Bilateral Breast) Diagnosis:       Acquired absence of breast and absent nipple, bilateral      Personal history of breast cancer      Breast asymmetry    Surgeon: Ministerio Newton MD Provider: Manny Beaulieu MD    Anesthesia Type: general ASA Status: 3          Anesthesia Type: general    Vitals  Vitals Value Taken Time   /72 01/21/21 1006   Temp 97.4 °F (36.3 °C) 01/21/21 1006   Pulse 66 01/21/21 1006   Resp 18 01/21/21 1006   SpO2 96 % 01/21/21 1006           Post Anesthesia Care and Evaluation    Patient location during evaluation: PACU  Patient participation: waiting for patient participation  Level of consciousness: sleepy but conscious  Pain management: adequate  Airway patency: patent  Anesthetic complications: No anesthetic complications  PONV Status: none  Cardiovascular status: hemodynamically stable and acceptable  Respiratory status: nonlabored ventilation, acceptable, nasal cannula and oral airway  Hydration status: acceptable

## 2021-01-21 NOTE — ANESTHESIA PROCEDURE NOTES
Airway  Urgency: elective    Date/Time: 1/21/2021 7:35 AM  Airway not difficult    General Information and Staff    Patient location during procedure: OR  CRNA: Namrata Colmenares CRNA    Indications and Patient Condition  Indications for airway management: airway protection    Preoxygenated: yes  MILS not maintained throughout  Mask difficulty assessment: 1 - vent by mask    Final Airway Details  Final airway type: endotracheal airway      Successful airway: ETT  Cuffed: yes   Successful intubation technique: direct laryngoscopy  Facilitating devices/methods: intubating stylet  Endotracheal tube insertion site: oral  Blade: Jomar  Blade size: 3  ETT size (mm): 7.0  Cormack-Lehane Classification: grade I - full view of glottis  Placement verified by: chest auscultation and capnometry   Measured from: lips  ETT/EBT  to lips (cm): 20  Number of attempts at approach: 1  Assessment: lips, teeth, and gum same as pre-op and atraumatic intubation    Additional Comments  Symmetric chest rise and fall. +ETCO2 +BBS.

## 2021-01-21 NOTE — H&P
Patient Care Team:      Chief complaint breast implant placement and exchange    Subjective:  Patient is a 70 y.o.female who presents with a history of breast cancer. Patient underwent a left mastectomy 28 years ago and had this side reconstructed about 10 years ago. She more recently was diagnosed with a breast cancer in her right breast and had a right mastectomy on 6/9/2020. At this time a tissue expander was placed for reconstruction. Patient states that she has healed well from this procedure and had no issues post operatively. Today her right tissue expander will be replaced with a permanent implant and her left implant will be exchanged. Patient denies any recent changes to her overall health.      Review of Systems:  General ROS: negative  Cardiovascular ROS: no chest pain or dyspnea on exertion  Respiratory ROS: no cough, shortness of breath, or wheezing      Allergies: No Known Allergies       Latex: negative  Contrast Dye: negative     Home Meds    Medications Prior to Admission   Medication Sig Dispense Refill Last Dose   • chlorthalidone (HYGROTON) 25 MG tablet Take 25 mg by mouth Daily.   1/21/2021 at 0400   • losartan (COZAAR) 100 MG tablet Take 100 mg by mouth Daily.   1/20/2021 at 0800   • Multiple Vitamins-Minerals (MULTIVITAMIN ADULT EXTRA C PO) Take 1 tablet by mouth Daily.   1/20/2021 at 0800   • potassium chloride ER (K-TAB) 20 MEQ tablet controlled-release ER tablet Take 20 mEq by mouth As Needed.   1/20/2021 at 2000   • pantoprazole (PROTONIX) 40 MG EC tablet Take 40 mg by mouth Daily.  11 1/14/2021 at 0800     PMH:   Past Medical History:   Diagnosis Date   • Anemia    • Breast cancer (CMS/HCC) 1991    left   • Breast cancer (CMS/HCC)     RIGHT   • Diabetes mellitus (CMS/HCC)     PRE-DAIBETIC   • Diverticulosis    • DVT (deep venous thrombosis) (CMS/HCC) 1990's   • GERD (gastroesophageal reflux disease)    • Gout    • H/O heart artery stent    • History of breast cancer 9/15/2016   •  "History of melanoma 9/15/2016   • History of pulmonary embolus (PE) 1990's   • History of transfusion    • Hypertension 9/15/2016   • Iron deficiency anemia 9/15/2016   • Melanoma (CMS/HCC)     UPPER LEFT LEG   • MI (myocardial infarction) (CMS/HCC) 2005   • PONV (postoperative nausea and vomiting)    • Psoriasis      PSH:    Past Surgical History:   Procedure Laterality Date   • AUGMENTATION MAMMAPLASTY Bilateral 2003   • BREAST RECONSTRUCTION, BREAST TISSUE EXPANDER INSERTION Bilateral 6/9/2020    Procedure: BREAST IMMEDIATE RECONSTRUCTION WITH TISSUE EXPANDER RIGHT;  Surgeon: Ministerio Newton MD;  [facility];  Service: Plastics;  Laterality: Bilateral;   • CARDIAC CATHETERIZATION      1 STENT   • CATARACT EXTRACTION Bilateral    • COLONOSCOPY     • DILATION AND CURETTAGE, DIAGNOSTIC / THERAPEUTIC     • MASTECTOMY Left    • MASTECTOMY W/ SENTINEL NODE BIOPSY Right 6/9/2020    Procedure: SIMPLE MASTECTOMY AND SENTINEL NODE BIOPSY RIGHT;  Surgeon: Barbara Butt MD;  Location:  [facility];  Service: General;  Laterality: Right;   • REDUCTION MAMMAPLASTY Right 2003   • SKIN BIOPSY     • TUBAL ABDOMINAL LIGATION       Immunization History: pneumo up to date   Flu 2020  Tetanus up to date  Social History:   Tobacco negative   Alcohol occasional       Physical Exam:/96 (BP Location: Right arm, Patient Position: Lying)   Pulse 72   Temp 97.4 °F (36.3 °C) (Tympanic)   Resp 16   Ht 157.5 cm (62\")   Wt 75.8 kg (167 lb)   SpO2 96%   BMI 30.54 kg/m²       General Appearance:    Alert, cooperative, no distress, appears stated age   Head:    Normocephalic, without obvious abnormality, atraumatic   Lungs:     Clear to auscultation bilaterally, respirations unlabored    Heart:   Regular rate and rhythm, S1 and S2 normal, no murmur, rub    or gallop    Abdomen:    Soft without tenderness   Breast Exam:    deferred   Genitalia:    deferred   Extremities:   Extremities normal, atraumatic, no cyanosis or edema "   Skin:   Skin color, texture, turgor normal, no rashes or lesions   Neurologic:   Grossly intact     Results Review:   LABS:  Lab Results   Component Value Date    WBC 6.35 01/19/2021    HGB 15.5 01/19/2021    HCT 47.7 (H) 01/19/2021    MCV 94.3 01/19/2021     01/19/2021    NEUTROABS 2.62 06/30/2020    GLUCOSE 109 (H) 01/19/2021    BUN 17 01/19/2021    CREATININE 0.81 01/19/2021    EGFRIFNONA 70 01/19/2021     01/19/2021    K 3.7 01/19/2021     01/19/2021    CO2 29.0 01/19/2021    CALCIUM 10.7 (H) 01/19/2021    ALBUMIN 3.90 06/30/2020     (H) 06/30/2020    ALT 89 (H) 06/30/2020    BILITOT 0.5 06/30/2020       RADIOLOGY:  Imaging Results (Last 72 Hours)     ** No results found for the last 72 hours. **          Impression: personal history of malignant neoplasm of breast     Plan: right breast tissue expander to permanent implant, left breast implant exchange, right complex closure   Kim Selby PA-C 1/21/2021 06:59 EST

## 2021-01-21 NOTE — OP NOTE
BILATERAL  BREAST TISSUE EXPANDER REMOVAL, IMPLANT INSERTION  Preoperative diagnosis:  1. Personal history breast cancer  2.  Absent bilateral breast and nipple areolar complex  3.  Breast asymmetry     Postoperative diagnosis:  1. Personal history breast cancer  2.  Absent bilateral breast and nipple areolar complex  3.  Breast asymmetry       Surgeon: Ministerio Newton MD    Assistants: Katelynn CASTLE was responsible for performing the following activities: Retraction, Suction, Irrigation, Suturing, Closing and Placing Dressing and their skilled assistance was necessary for the success of this case.    Anesthesia: General    Procedure:  1.  Bilateral tissue expander exchange for a permanent silicone gel implant with significant capsular work.  The silicone gel implant is a Oohlyan Natrelle Inspira Cohesive Style SCX volume 800 cc serial number on the right [id number] and serial number on the left [id number].  2.  Complex closure bilateral reconstructed breast measuring 50 cm     Indication: The patient is a 70 year old white female who developed breast cancer.  The patient underwent bilateral mastectomy followed by bilateral immediate implant-based breast reconstruction with placement of tissue expanders.  The patient is now ready for tissue expander exchange.  The patient is also noted to have excess skin of her bilateral reconstructed breast.  I recommended complex closure.  All techniques, potential complications and typical postoperative course were discussed with the patient.  The patient indicated understanding and elected to proceed.    Findings:  1.  Absent breast and nipple areola complex  2.  Laterally displaced bilateral reconstructed breast pocket    Description: The patient was taken from preoperative holding to the operating room after informed consent was signed the chart and placed under general anesthesia successfully.  Prior to induction of anesthesia the patient received a prophylactic dose of  antibiotics and had bilateral lower extremity sequential compression devices in place and operational.  The patient was supine on the operating table.  The patient had a pillow placed beneath knees.  The patient's arms were gently abducted to 90° and the patient had ulnar nerve padding.  The chest wall was prepped and draped in the usual sterile fashion.  After properly identifying the patient and the patient's problem, the bilateral mastectomy incisions were tailor tacked with staples incorporating excess lower pole skin and medial skin.  The staples were marked, released, and the prior marks were brought into continuity with 1 another with a marking pen.  This planned incision was then injected with 1% lidocaine with 1-100,000 epinephrine.  A 10 blade was used to incise the skin.  The subcutaneous tissues dissected with electrocautery.  The tissue expander was encountered.  The tissue expander was removed.  The pocket was irrigated with antibiotic solution.  In the lateral gutter, the capsule underwent pop corning.  A 800 cc sizer was placed in the breast pocket via a Ey funnel in the capsule and skin were temporarily closed with Vicryl suture and staples respectively.  My attention then turned to the left side.  The skin was tailor tacked with staples.  The staple were marked.  They were then released and brought into continuity with 1 another with a marking pen.  A 10 blade was used to incise the skin.  The tissue expander was encountered.  The fluid within the tissue expander was removed via the port.  The tissue expander was removed.  The pocket was irrigated with antibiotic solution.  In the lateral gutter, the capsule underwent pop corning.  A 800 cc sizer was placed in the breast pocket via a Ye funnel in the capsule and skin were temporarily closed with Vicryl suture and staples respectively.  The patient was set upright to check for symmetry.  Symmetry was confirmed.  The patient was sat back down  supine on the operating table.  The temporary closure and tissue expander were taken down and removed respectively on the left side.  The pocket was irrigated with antibiotic and Betadine solution.  Hemostasis was achieved with electrocautery.  The capsule was tagged with 3-0 Vicryl pop offs sutures.  The skin instruments were painted with Betadine.  My gloves were changed.  The planned implant was then inserted into the breast pocket via the Ye funnel.  The Vicryl ties were then tied down.  The skin was then temporary closed with staples.  My attention then turned to the right side. The temporary closure and tissue expander were taken down and removed respectively on the left side.  The pocket was irrigated with antibiotic and Betadine solution.  Hemostasis was achieved with electrocautery.  The capsule was tagged with 3-0 Vicryl pop offs sutures.  The skin instruments were painted with Betadine.  My gloves were changed.  The planned implant was then inserted into the breast pocket via the Ye funnel and oriented and seated properly.  The Vicryl ties were then tied down.  The skin was then temporary closed with staples.  Simultaneously, all skin was closed with 3-0 Monocryl suture in a deep dermal buried interrupted fashion and 3-0 Stratafix suture in an intracuticular fashion.  The length of the complex closure bilaterally measured 50 cm.  Tissue glue was applied.  A surgical bra and Kerlix fluffs were applied.  The patient was turned over to anesthesia at which point the patient was awoken from anesthesia successfully and taken to PACU in stable condition.  I was present for the entire procedure.  All counts were correct.    Estimated blood loss: Minimal    Drains: None    Complications: None immediate

## 2021-01-21 NOTE — BRIEF OP NOTE
BREAST RECONSTRUCTION, BREAST TISSUE EXPANDER REMOVAL, IMPLANT INSERTION  Progress Note    Bailey Howe  1/21/2021    Pre-op Diagnosis:   * Acquired absence of breast and absent nipple, bilateral [Z90.13]     * Personal history of breast cancer [Z85.3]     * Breast asymmetry [N64.89]       Post-Op Diagnosis Codes:     * Acquired absence of breast and absent nipple, bilateral [Z90.13]     * Personal history of breast cancer [Z85.3]     * Breast asymmetry [N64.89]    Procedure/CPT® Codes:  TX INSJ/RPLCMT BREAST IMPLANT SEP DAY MASTECTOMY [03661]  TX RECMPL WND TRUNK 2.6-7.5 CM [82970]  TX REP,SKIN,TRUNK,CMPLX,+5 CM/< [78851]      Procedure(s):  RIGHT BREAST TISSUE EXPANDER TO PERMANENT IMPLANT AND LEFT BREAST IMPLANT EXCHANGE, RIGHT COMPLEX CLOSURE    Surgeon(s):  Ministerio Newton MD    Anesthesia: General    Staff:   Circulator: Ruma Feng RN; Tim Alvarado RN; Brii Campos RN  Scrub Person: Millicent Abbasi  Assistant: Kristi Lynch PA  Assistant: Kristi Lynch PA      Estimated Blood Loss: minimal    Urine Voided: * No values recorded between 1/21/2021  7:29 AM and 1/21/2021  9:44 AM *    Specimens:                None          Drains:   [REMOVED] Closed/Suction Drain 1 Lateral RUQ Bulb 15 Fr. (Removed)       [REMOVED] Closed/Suction Drain 2 Lateral;Medial RUQ Bulb 15 Fr. (Removed)       Findings: absent breast    Complications: none immediate    Assistant: Kristi Lynch PA  was responsible for performing the following activities: Retraction, Suction, Irrigation, Suturing, Closing and Placing Dressing and their skilled assistance was necessary for the success of this case.    Ministerio Newton MD     Date: 1/21/2021  Time: 09:44 EST

## 2021-01-21 NOTE — ANESTHESIA PREPROCEDURE EVALUATION
Anesthesia Evaluation     Patient summary reviewed and Nursing notes reviewed   no history of anesthetic complications:  NPO Solid Status: > 8 hours  NPO Liquid Status: > 2 hours           Airway   Mallampati: II  TM distance: >3 FB  Neck ROM: full  No difficulty expected  Dental - normal exam     Pulmonary - negative pulmonary ROS and normal exam    breath sounds clear to auscultation  Cardiovascular - normal exam    ECG reviewed  Rhythm: regular  Rate: normal    (+) hypertension, CAD, cardiac stents (2005)       Neuro/Psych- negative ROS  GI/Hepatic/Renal/Endo    (+)  GERD,  diabetes mellitus (Diet controlled) type 2,     Musculoskeletal     Abdominal    Substance History      OB/GYN          Other                        Anesthesia Plan    ASA 3     general     intravenous induction     Anesthetic plan, all risks, benefits, and alternatives have been provided, discussed and informed consent has been obtained with: patient.    Plan discussed with CRNA.

## 2021-02-17 DIAGNOSIS — Z23 IMMUNIZATION DUE: ICD-10-CM

## 2021-03-11 ENCOUNTER — IMMUNIZATION (OUTPATIENT)
Dept: VACCINE CLINIC | Facility: HOSPITAL | Age: 71
End: 2021-03-11

## 2021-03-11 PROCEDURE — 0001A: CPT | Performed by: INTERNAL MEDICINE

## 2021-03-11 PROCEDURE — 91300 HC SARSCOV02 VAC 30MCG/0.3ML IM: CPT | Performed by: INTERNAL MEDICINE

## 2021-03-29 ENCOUNTER — HOSPITAL ENCOUNTER (INPATIENT)
Dept: HOSPITAL 79 - ER1 | Age: 71
LOS: 4 days | Discharge: HOME | DRG: 101 | End: 2021-04-02
Attending: STUDENT IN AN ORGANIZED HEALTH CARE EDUCATION/TRAINING PROGRAM | Admitting: STUDENT IN AN ORGANIZED HEALTH CARE EDUCATION/TRAINING PROGRAM
Payer: COMMERCIAL

## 2021-03-29 VITALS — WEIGHT: 155 LBS | HEIGHT: 62 IN | BODY MASS INDEX: 28.52 KG/M2

## 2021-03-29 DIAGNOSIS — D50.9: ICD-10-CM

## 2021-03-29 DIAGNOSIS — Z85.820: ICD-10-CM

## 2021-03-29 DIAGNOSIS — H53.8: ICD-10-CM

## 2021-03-29 DIAGNOSIS — I65.23: ICD-10-CM

## 2021-03-29 DIAGNOSIS — Z79.01: ICD-10-CM

## 2021-03-29 DIAGNOSIS — Z90.13: ICD-10-CM

## 2021-03-29 DIAGNOSIS — G40.909: Primary | ICD-10-CM

## 2021-03-29 DIAGNOSIS — I25.2: ICD-10-CM

## 2021-03-29 DIAGNOSIS — Z86.711: ICD-10-CM

## 2021-03-29 DIAGNOSIS — Z86.718: ICD-10-CM

## 2021-03-29 DIAGNOSIS — Z82.3: ICD-10-CM

## 2021-03-29 DIAGNOSIS — Z79.82: ICD-10-CM

## 2021-03-29 DIAGNOSIS — Z85.3: ICD-10-CM

## 2021-03-29 DIAGNOSIS — Z20.822: ICD-10-CM

## 2021-03-29 DIAGNOSIS — I10: ICD-10-CM

## 2021-03-29 LAB
BUN/CREATININE RATIO: 26 (ref 0–10)
HGB BLD-MCNC: 16.8 GM/DL (ref 12.3–15.3)
RED BLOOD COUNT: 5.18 M/UL (ref 4–5.1)
WHITE BLOOD COUNT: 6.7 K/UL (ref 4.5–11)

## 2021-03-29 PROCEDURE — G0378 HOSPITAL OBSERVATION PER HR: HCPCS

## 2021-03-29 PROCEDURE — U0002 COVID-19 LAB TEST NON-CDC: HCPCS

## 2021-03-30 LAB
BUN/CREATININE RATIO: 22 (ref 0–10)
HGB BLD-MCNC: 15.4 GM/DL (ref 12.3–15.3)
RED BLOOD COUNT: 4.77 M/UL (ref 4–5.1)
WHITE BLOOD COUNT: 6.8 K/UL (ref 4.5–11)

## 2021-03-30 PROCEDURE — B24BZZ4 ULTRASONOGRAPHY OF HEART WITH AORTA, TRANSESOPHAGEAL: ICD-10-PCS | Performed by: INTERNAL MEDICINE

## 2021-04-01 LAB — BUN/CREATININE RATIO: 26 (ref 0–10)

## 2021-04-06 ENCOUNTER — IMMUNIZATION (OUTPATIENT)
Dept: VACCINE CLINIC | Facility: HOSPITAL | Age: 71
End: 2021-04-06

## 2021-04-06 PROCEDURE — 0002A: CPT | Performed by: INTERNAL MEDICINE

## 2021-04-06 PROCEDURE — 91300 HC SARSCOV02 VAC 30MCG/0.3ML IM: CPT | Performed by: INTERNAL MEDICINE

## 2021-11-22 ENCOUNTER — HOSPITAL ENCOUNTER (EMERGENCY)
Dept: HOSPITAL 79 - ER1 | Age: 71
Discharge: NURSING, REHAB OR LONG-TERM CARE | End: 2021-11-22
Payer: COMMERCIAL
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
## 2021-11-22 DIAGNOSIS — Z85.3: ICD-10-CM

## 2021-11-22 DIAGNOSIS — I10: ICD-10-CM

## 2021-11-22 DIAGNOSIS — S12.100A: Primary | ICD-10-CM

## 2021-11-22 DIAGNOSIS — I25.2: ICD-10-CM

## 2021-11-22 LAB
BUN/CREATININE RATIO: 21 (ref 0–10)
HGB BLD-MCNC: 15.9 GM/DL (ref 12.3–15.3)
RED BLOOD COUNT: 4.98 M/UL (ref 4–5.1)
WHITE BLOOD COUNT: 7.5 K/UL (ref 4.5–11)

## 2022-03-08 ENCOUNTER — HOSPITAL ENCOUNTER (OUTPATIENT)
Dept: HOSPITAL 79 - KOH-I | Age: 72
End: 2022-03-08
Attending: PSYCHIATRY & NEUROLOGY
Payer: COMMERCIAL

## 2022-03-08 DIAGNOSIS — S12.100A: Primary | ICD-10-CM

## 2022-03-08 DIAGNOSIS — S19.9XXS: ICD-10-CM

## 2022-03-08 DIAGNOSIS — M43.12: ICD-10-CM

## 2022-03-09 ENCOUNTER — HOSPITAL ENCOUNTER (OUTPATIENT)
Dept: HOSPITAL 79 - EXRD | Age: 72
End: 2022-03-09
Attending: NURSE PRACTITIONER
Payer: COMMERCIAL

## 2022-03-09 DIAGNOSIS — M81.0: Primary | ICD-10-CM

## 2022-03-09 DIAGNOSIS — M85.89: ICD-10-CM

## 2022-04-14 ENCOUNTER — HOSPITAL ENCOUNTER (OUTPATIENT)
Dept: HOSPITAL 79 - KOH-I | Age: 72
End: 2022-04-14
Attending: NEUROLOGICAL SURGERY
Payer: COMMERCIAL

## 2022-04-14 DIAGNOSIS — S12.9XXA: Primary | ICD-10-CM

## 2022-04-14 DIAGNOSIS — M48.02: ICD-10-CM

## 2023-10-18 NOTE — TELEPHONE ENCOUNTER
----- Message from Jolene Maurer sent at 10/18/2023  9:12 AM CDT -----  Contact: [phone]  [email or web address]     Patient is calling for Medical Advice regarding:  Would like to know if pt need blood work done before appt on 10.20.23    Would like response via [facility]:  call back     Comments:   Please e-mail the orders for pt blood work so that it can be done in [place].     From PACU. Alert and oriented. No c/o. Vss. abd soft. Notified patient of lab results and need to follow up on repeat labs in 2 months.

## (undated) DEVICE — PROXIMATE RH ROTATING HEAD SKIN STAPLERS (35 WIDE) CONTAINS 35 STAINLESS STEEL STAPLES: Brand: PROXIMATE

## (undated) DEVICE — COVER,LIGHT HANDLE,FLX,1/PK: Brand: MEDLINE INDUSTRIES, INC.

## (undated) DEVICE — CLN MEGADYNE TP SS

## (undated) DEVICE — INTENDED USE FOR SURGICAL MARKING ON INTACT SKIN, ALSO PROVIDES A PERMANENT METHOD OF IDENTIFYING OBJECTS IN THE OPERATING ROOM: Brand: WRITESITE® REGULAR TIP SKIN MARKER

## (undated) DEVICE — PK CHST BRST 10

## (undated) DEVICE — ANTIBACTERIAL UNDYED BRAIDED (POLYGLACTIN 910), SYNTHETIC ABSORBABLE SUTURE: Brand: COATED VICRYL

## (undated) DEVICE — STERILE PVP: Brand: MEDLINE INDUSTRIES, INC.

## (undated) DEVICE — SYR LUERLOK 50ML

## (undated) DEVICE — BANDAGE,GAUZE,BULKEE II,4.5"X4.1YD,STRL: Brand: MEDLINE

## (undated) DEVICE — SUT SILK 2/0 SH 30IN K833H

## (undated) DEVICE — CVR HNDL LIGHT RIGID

## (undated) DEVICE — TRAP FLD MINIVAC MEGADYNE 100ML

## (undated) DEVICE — UNDERGLV SURG BIOGEL INDICATOR LF PF 7.5

## (undated) DEVICE — BLAKE SILICONE DRAIN, 15 FR ROUND, HUBLESS WITH 3/16" TROCAR: Brand: BLAKE

## (undated) DEVICE — ELECTRD BLD EZ CLN MOD XLNG 2.75IN

## (undated) DEVICE — 1010 S-DRAPE TOWEL DRAPE 10/BX: Brand: STERI-DRAPE™

## (undated) DEVICE — GLV SURG SENSICARE W/ALOE PF LF 7 STRL

## (undated) DEVICE — TP PLSTC CLR TRNSPORE 1IN SURG

## (undated) DEVICE — IRRIGATOR BULB ASEPTO 60CC STRL

## (undated) DEVICE — SUT MNCRYL PLS ANTIB UD 4/0 PS2 18IN

## (undated) DEVICE — BRA COMPR SURG STL958 QUEEN

## (undated) DEVICE — INTRAOPERATIVE COVER KIT, 10 PACK: Brand: SITE-RITE

## (undated) DEVICE — SUT MNCRYL PLS ANTIB UD 3/0 PS2 27IN

## (undated) DEVICE — SUT SILK 3/0 TIES 18IN A184H

## (undated) DEVICE — DISH PETRI 3.5IN MD STRL LF

## (undated) DEVICE — SAFESECURE,SECUREMENT,FOLEY CATH,STERILE: Brand: MEDLINE

## (undated) DEVICE — PCH DRN BRST RECONSTRUCT BRA LXF

## (undated) DEVICE — SUT ETHLN 2/0 PS 18IN 585H

## (undated) DEVICE — PREVENA PEEL & PLACE SYSTEM KIT- 13 CM: Brand: PREVENA™ PEEL & PLACE™

## (undated) DEVICE — JACKSON-PRATT 100CC BULB RESERVOIR: Brand: CARDINAL HEALTH

## (undated) DEVICE — 3M™ STERI-STRIP™ REINFORCED ADHESIVE SKIN CLOSURES, R1547, 1/2 IN X 4 IN (12 MM X 100 MM), 6 STRIPS/ENVELOPE: Brand: 3M™ STERI-STRIP™

## (undated) DEVICE — BIOPATCH™ ANTIMICROBIAL DRESSING WITH CHLORHEXIDINE GLUCONATE IS A HYDROPHILLIC POLYURETHANE ABSORPTIVE FOAM WITH CHLORHEXIDINE GLUCONATE (CHG) WHICH INHIBITS BACTERIAL GROWTH UNDER THE DRESSING. THE DRESSING IS INTENDED TO BE USED TO ABSORB EXUDATE, COVER A WOUND CAUSED BY VASCULAR AND NONVASCULAR PERCUTANEOUS MEDICAL DEVICES DURING SURGERY, AS WELL AS REDUCE LOCAL INFECTION AND COLONIZATION OF MICROORGANISMS.: Brand: BIOPATCH

## (undated) DEVICE — INTENDED FOR TISSUE SEPARATION, AND OTHER PROCEDURES THAT REQUIRE A SHARP SURGICAL BLADE TO PUNCTURE OR CUT.: Brand: BARD-PARKER ® SAFETYLOCK CARBON RIB-BACK BLADES

## (undated) DEVICE — GAUZE FLUFF 1 PLY: Brand: DEROYAL

## (undated) DEVICE — SUT ETHLN 3/0 PC5 18IN 1893G

## (undated) DEVICE — ELECTRD BLD EXT EDGE 1P COAT 4IN STRL

## (undated) DEVICE — PENCL E/S ULTRAVAC TELESCP NOSE HOLSTR 10FT

## (undated) DEVICE — GLV SURG SENSICARE PI MIC PF SZ6.5 LF STRL

## (undated) DEVICE — LEX GENERAL BREAST: Brand: MEDLINE INDUSTRIES, INC.

## (undated) DEVICE — SUT SILK 2/0 TIES 18IN A185H

## (undated) DEVICE — 3M™ BAIR HUGGER® UNDERBODY BLANKET, ADULT, 10 PER CASE 54500: Brand: BAIR HUGGER™